# Patient Record
Sex: MALE | Race: WHITE | NOT HISPANIC OR LATINO | Employment: FULL TIME | URBAN - METROPOLITAN AREA
[De-identification: names, ages, dates, MRNs, and addresses within clinical notes are randomized per-mention and may not be internally consistent; named-entity substitution may affect disease eponyms.]

---

## 2018-09-04 ENCOUNTER — TRANSCRIBE ORDERS (OUTPATIENT)
Dept: ADMINISTRATIVE | Facility: HOSPITAL | Age: 63
End: 2018-09-04

## 2018-09-04 DIAGNOSIS — K92.2 GASTROINTESTINAL HEMORRHAGE, UNSPECIFIED GASTROINTESTINAL HEMORRHAGE TYPE: ICD-10-CM

## 2018-09-04 DIAGNOSIS — D64.9 ANEMIA, UNSPECIFIED TYPE: Primary | ICD-10-CM

## 2018-09-07 ENCOUNTER — HOSPITAL ENCOUNTER (OUTPATIENT)
Dept: RADIOLOGY | Facility: HOSPITAL | Age: 63
Discharge: HOME | End: 2018-09-07
Attending: INTERNAL MEDICINE
Payer: COMMERCIAL

## 2018-09-07 DIAGNOSIS — K92.2 GASTROINTESTINAL HEMORRHAGE, UNSPECIFIED GASTROINTESTINAL HEMORRHAGE TYPE: ICD-10-CM

## 2018-09-07 DIAGNOSIS — D64.9 ANEMIA, UNSPECIFIED TYPE: ICD-10-CM

## 2018-09-07 PROCEDURE — 74250 X-RAY XM SM INT 1CNTRST STD: CPT

## 2021-04-13 DIAGNOSIS — Z23 ENCOUNTER FOR IMMUNIZATION: ICD-10-CM

## 2021-08-17 ENCOUNTER — TELEPHONE (OUTPATIENT)
Dept: GASTROENTEROLOGY | Facility: CLINIC | Age: 66
End: 2021-08-17

## 2021-08-17 NOTE — TELEPHONE ENCOUNTER
Called and spoke with patient regarding an overdue recall for EGD for hx of Woodward's  After going through chart and asking questions, he has to check with his wife due to her being a  and going back to school  He will call back and let me know what day is good for her  I will call him in 2 weeks if he doesn't return call  Check list is complete, just have to put date on it

## 2021-08-31 NOTE — TELEPHONE ENCOUNTER
Called and spoke with patient  He was able to discuss with wife and they will have to put off until early next year  He will call when ready to schedule

## 2022-04-15 ENCOUNTER — OFFICE VISIT (OUTPATIENT)
Dept: FAMILY MEDICINE CLINIC | Facility: CLINIC | Age: 67
End: 2022-04-15
Payer: COMMERCIAL

## 2022-04-15 VITALS
DIASTOLIC BLOOD PRESSURE: 84 MMHG | TEMPERATURE: 98.4 F | SYSTOLIC BLOOD PRESSURE: 120 MMHG | BODY MASS INDEX: 36.02 KG/M2 | WEIGHT: 271.8 LBS | HEART RATE: 97 BPM | OXYGEN SATURATION: 97 % | HEIGHT: 73 IN

## 2022-04-15 DIAGNOSIS — Z12.11 COLON CANCER SCREENING: ICD-10-CM

## 2022-04-15 DIAGNOSIS — K21.00 GASTROESOPHAGEAL REFLUX DISEASE WITH ESOPHAGITIS, UNSPECIFIED WHETHER HEMORRHAGE: Primary | ICD-10-CM

## 2022-04-15 DIAGNOSIS — K21.9 GASTROESOPHAGEAL REFLUX DISEASE, UNSPECIFIED WHETHER ESOPHAGITIS PRESENT: Primary | ICD-10-CM

## 2022-04-15 PROCEDURE — 1036F TOBACCO NON-USER: CPT | Performed by: FAMILY MEDICINE

## 2022-04-15 PROCEDURE — 3008F BODY MASS INDEX DOCD: CPT | Performed by: FAMILY MEDICINE

## 2022-04-15 PROCEDURE — 3725F SCREEN DEPRESSION PERFORMED: CPT | Performed by: FAMILY MEDICINE

## 2022-04-15 PROCEDURE — 99213 OFFICE O/P EST LOW 20 MIN: CPT | Performed by: FAMILY MEDICINE

## 2022-04-15 RX ORDER — DIPHENOXYLATE HYDROCHLORIDE AND ATROPINE SULFATE 2.5; .025 MG/1; MG/1
1 TABLET ORAL DAILY
COMMUNITY

## 2022-04-15 RX ORDER — GLUCOSAM/CHONDRO/HERB 149/HYAL 750-100 MG
3 TABLET ORAL DAILY
COMMUNITY

## 2022-04-15 RX ORDER — B-COMPLEX WITH VITAMIN C
TABLET ORAL
COMMUNITY

## 2022-04-15 RX ORDER — PANTOPRAZOLE SODIUM 40 MG/1
40 TABLET, DELAYED RELEASE ORAL DAILY
Qty: 90 TABLET | Refills: 1 | Status: SHIPPED | OUTPATIENT
Start: 2022-04-15

## 2022-04-15 RX ORDER — PANTOPRAZOLE SODIUM 40 MG/1
40 TABLET, DELAYED RELEASE ORAL DAILY
COMMUNITY
Start: 2022-03-22 | End: 2022-04-15 | Stop reason: SDUPTHER

## 2022-04-26 NOTE — PROGRESS NOTES
Patient ID: Luisana Moe is a 61 y o  male  HPI: 61 y o male presents to get established into practice  He has a history of Barretts and GERD  He gets screened regularly for GERD  SUBJECTIVE    Family History   Problem Relation Age of Onset    Valvular heart disease Mother     Stroke Father     Hyperlipidemia Father     Macular degeneration Sister      Social History     Socioeconomic History    Marital status: /Civil Union     Spouse name: Not on file    Number of children: Not on file    Years of education: Not on file    Highest education level: Not on file   Occupational History    Not on file   Tobacco Use    Smoking status: Never Smoker    Smokeless tobacco: Never Used   Vaping Use    Vaping Use: Never used   Substance and Sexual Activity    Alcohol use:  Yes     Alcohol/week: 5 0 standard drinks     Types: 5 Standard drinks or equivalent per week    Drug use: Never    Sexual activity: Yes     Partners: Female   Other Topics Concern    Not on file   Social History Narrative    Not on file     Social Determinants of Health     Financial Resource Strain: Not on file   Food Insecurity: Not on file   Transportation Needs: Not on file   Physical Activity: Not on file   Stress: Not on file   Social Connections: Not on file   Intimate Partner Violence: Not on file   Housing Stability: Not on file     Past Medical History:   Diagnosis Date    Woodward's esophagus     GERD (gastroesophageal reflux disease)     Hiatal hernia      Past Surgical History:   Procedure Laterality Date    CHOLECYSTECTOMY      CIRCUMCISION      KNEE SURGERY      TONSILLECTOMY       No Known Allergies    Current Outpatient Medications:     Misc Natural Products (Glucosamine Chond Complex/MSM) TABS, Take 3 capsules by mouth daily, Disp: , Rfl:     Multiple Vitamins-Minerals (ICAPS AREDS 2 PO), Take 2 capsules by mouth daily, Disp: , Rfl:     multivitamin (THERAGRAN) TABS, Take 1 tablet by mouth daily, Disp: , Rfl:     Zinc 100 MG TABS, Take by mouth, Disp: , Rfl:     pantoprazole (PROTONIX) 40 mg tablet, Take 1 tablet (40 mg total) by mouth daily, Disp: 90 tablet, Rfl: 1    Review of Systems  Constitutional:     Denies fever, chills ,fatigue ,weakness ,weight loss, weight gain     ENT: Denies earache ,loss of hearing ,nosebleed, nasal discharge,nasal congestion ,sore throat ,hoarseness  Pulmonary: Denies shortness of breath ,cough  ,dyspnea on exertion, orthopnea  ,PND   Cardiovascular:  Denies bradycardia , tachycardia  ,palpations, lower extremity edema leg, claudication  Breast:  Denies new or changing breast lumps ,nipple discharge ,nipple changes  Abdomen:  Denies abdominal pain , anorexia , indigestion, nausea, vomiting, constipation, diarrhea  Musculoskeletal: Denies myalgias, arthralgias, joint swelling, joint stiffness , limb pain, limb swelling  Gu: denies dysuria, polyuria  Skin: Denies skin rash, skin lesion, skin wound, itching, dry skin  Neuro: Denies headache, numbness, tingling, confusion, loss of consciousness, dizziness, vertigo  Psychiatric: Denies feelings of depression, suicidal ideation, anxiety, sleep disturbances    OBJECTIVE  /84   Pulse 97   Temp 98 4 °F (36 9 °C)   Ht 6' 1" (1 854 m)   Wt 123 kg (271 lb 12 8 oz)   SpO2 97%   BMI 35 86 kg/m²   Constitutional:   NAD, well appearing and well nourished      ENT:   Conjunctiva and lids: no injection, edema, or discharge     Pupils and iris: PATRICK bilaterally    External inspection of ears and nose: normal without deformities or discharge  Otoscopic exam: Canals patent without erythema  Nasal mucosa, septum and turbinates: Normal or edema or discharge         Oropharynx:  Moist mucosa, normal tongue and tonsils without lesions  No erythema        Pulmonary:Respiratory effort normal rate and rhythm, no increased work of breathing   Auscultation of lungs:  Clear bilaterally with no adventitious breath sounds Cardiovascular: regular rate and rhythm, S1 and S2, no murmur, no edema and/or varicosities of LE      Abdomen: Soft and non-distended     Positive bowel sounds      No heptomegaly or splenomegaly      Gu: no suprapubic tenderness or CVA tenderness, no urethral discharge  Lymphatic:  No anterior or posterior cervical lymphadenopathy         Musculoskeletal:  Gait and station: Normal gait      Digits and nails normal without clubbing or cyanosis       Inspection/palpation of joints, bones, and muscles:  No joint tenderness, swelling, full active and passive range of motion       Skin: Normal skin turgor and no rashes      Neuro:     Normal reflexes   Psych:   alert and oriented to person, place and time     normal mood and affect       Assessment/Plan:Diagnoses and all orders for this visit:    Gastroesophageal reflux disease with esophagitis, unspecified whether hemorrhage    Colon cancer screening  -     Ambulatory referral for colonoscopy; Future    Other orders  -     Misc Natural Products (Glucosamine Chond Complex/MSM) TABS; Take 3 capsules by mouth daily  -     multivitamin (THERAGRAN) TABS; Take 1 tablet by mouth daily  -     Multiple Vitamins-Minerals (ICAPS AREDS 2 PO); Take 2 capsules by mouth daily  -     Discontinue: pantoprazole (PROTONIX) 40 mg tablet; Take 40 mg by mouth daily  -     Zinc 100 MG TABS; Take by mouth        Reviewed with patient plan to treat with abpve plan  Patiient instructed to call in 72 hours if not feeling better or if symptoms worsen

## 2022-05-02 ENCOUNTER — TELEPHONE (OUTPATIENT)
Dept: FAMILY MEDICINE CLINIC | Facility: CLINIC | Age: 67
End: 2022-05-02

## 2022-05-02 NOTE — TELEPHONE ENCOUNTER
HOME COVID TEST POSITIVE TODAY      Symptoms started 4/28/22   Cough  Sore throat  Congestion   SOB with activity   Fatigue  No fever    Asking for a virtual appointment with you ASAP

## 2022-05-02 NOTE — TELEPHONE ENCOUNTER
Please see if you can switch my 1100 with this pt for 1030 so the 1100 will be this patient for a virtual covid appt tomorrow

## 2022-05-03 ENCOUNTER — TELEMEDICINE (OUTPATIENT)
Dept: FAMILY MEDICINE CLINIC | Facility: CLINIC | Age: 67
End: 2022-05-03
Payer: COMMERCIAL

## 2022-05-03 DIAGNOSIS — U07.1 COVID-19: Primary | ICD-10-CM

## 2022-05-03 PROCEDURE — 99213 OFFICE O/P EST LOW 20 MIN: CPT | Performed by: FAMILY MEDICINE

## 2022-05-03 PROCEDURE — 1036F TOBACCO NON-USER: CPT | Performed by: FAMILY MEDICINE

## 2022-05-03 RX ORDER — AZITHROMYCIN 250 MG/1
TABLET, FILM COATED ORAL
Qty: 6 TABLET | Refills: 0 | Status: SHIPPED | OUTPATIENT
Start: 2022-05-03 | End: 2022-05-07

## 2022-05-03 RX ORDER — BROMPHENIRAMINE MALEATE, PSEUDOEPHEDRINE HYDROCHLORIDE, AND DEXTROMETHORPHAN HYDROBROMIDE 2; 30; 10 MG/5ML; MG/5ML; MG/5ML
10 SYRUP ORAL 4 TIMES DAILY PRN
Qty: 180 ML | Refills: 0 | Status: SHIPPED | OUTPATIENT
Start: 2022-05-03

## 2022-05-03 RX ORDER — PREDNISONE 10 MG/1
TABLET ORAL
Qty: 26 TABLET | Refills: 0 | Status: SHIPPED | OUTPATIENT
Start: 2022-05-03

## 2022-05-04 ENCOUNTER — TELEPHONE (OUTPATIENT)
Dept: FAMILY MEDICINE CLINIC | Facility: CLINIC | Age: 67
End: 2022-05-04

## 2022-05-04 NOTE — TELEPHONE ENCOUNTER
To be on the safe side, its recommended for vaccinated pts to isolate for 5 days and mask when out in public for a week after  Because of her his [facility] [facility]  and health issues, I'd wait 10 days before coming in contact with him and mask for a week after when around him

## 2022-05-04 NOTE — TELEPHONE ENCOUNTER
Patient called to give you an update since testing positive for COVID     He said he is feeling much better  He still has a slight cough and is a little stuffy   His voice is getting better    His wife also tested positive on 4/28    They are concerned about when they can safely be around he elderly father

## 2022-05-10 NOTE — PROGRESS NOTES
COVID-19 Outpatient Progress Note    Assessment/Plan:    Problem List Items Addressed This Visit     None      Visit Diagnoses     COVID-19    -  Primary    Relevant Medications    predniSONE 10 mg tablet    brompheniramine-pseudoephedrine-DM 30-2-10 MG/5ML syrup         Disposition:     Patient has COVID-19 infection  Based off CDC guidelines, they were recommended to isolate for 5 days from the date of the positive test  If they remain asymptomatic, isolation may be ended followed by 5 days of wearing a mask when around othes to minimize risk of infecting others  If they have a fever, continue to stay home until fever resolves for at least 24 hours  I have spent 15 minutes directly with the patient  Greater than 50% of this time was spent in counseling/coordination of care regarding: diagnostic results, prognosis, risks and benefits of treatment options, instructions for management, patient and family education, importance of treatment compliance, risk factor reductions and impressions  Encounter provider Tony Schaefer DO    Provider located at [address] [id number]    Recent Visits  Date Type Provider Dept   05/04/22 Telephone Alexi Beavers DO [address] [place] [address]   05/03/22 [address] [place] [address] recent visits within past 7 days and meeting all other requirements  Future Appointments  No visits were found meeting these conditions  Showing future appointments within next 150 days and meeting all other requirements     This virtual check-in was done via [address] and patient was informed that this is a secure, HIPAA-compliant platform  He agrees to proceed  Patient agrees to participate in a virtual check in via telephone or video visit instead of presenting to the office to address urgent/immediate medical needs  Patient is aware this is a billable service      After connecting through [facility], the patient was identified by name and date of birth  Nestor Bernard was informed that this was a telemedicine visit and that the exam was being conducted confidentially over secure lines  My office door was closed  No one else was in the room  Nestor Bernard acknowledged consent and understanding of privacy and security of the telemedicine visit  I informed the patient that I have reviewed his record in Epic and presented the opportunity for him to ask any questions regarding the visit today  The patient agreed to participate  Verification of patient location:  Patient is located in the following state in which I hold an active license: [place]    Subjective:   Nestor Bernard is a 61 y o  male who has been screened for COVID-19  Symptom change since last report: unchanged  Patient's symptoms include nasal congestion, sore throat, cough, myalgias and headache  Patient denies fever, chills, fatigue, malaise, rhinorrhea, anosmia, loss of taste, shortness of breath, chest tightness, abdominal pain, nausea, vomiting and diarrhea  - Date of symptom onset: 5/3/2022  - Date of positive COVID-19 test: 5/3/2022  COVID-19 vaccination status: Fully vaccinated with booster    Keyona Fair has been staying home and has isolated themselves in his home  He is taking care to not share personal items and is cleaning all surfaces that are touched often, like counters, tabletops, and doorknobs using household cleaning sprays or wipes  He is wearing a mask when he leaves his room       Lab Results   Component Value Date    [facility] Not Detected 09/18/2021     Past Medical History:   Diagnosis Date    Woodward's esophagus     GERD (gastroesophageal reflux disease)     Hiatal hernia      Past Surgical History:   Procedure Laterality Date    CHOLECYSTECTOMY      CIRCUMCISION      KNEE SURGERY      TONSILLECTOMY       Current Outpatient Medications   Medication Sig Dispense Refill    brompheniramine-pseudoephedrine-DM 30-2-10 MG/5ML syrup Take 10 mL by mouth 4 (four) times a day as needed for congestion or cough 180 mL 0    Misc Natural Products (Glucosamine Chond Complex/MSM) TABS Take 3 capsules by mouth daily      Multiple Vitamins-Minerals (ICAPS AREDS 2 PO) Take 2 capsules by mouth daily      multivitamin (THERAGRAN) TABS Take 1 tablet by mouth daily      pantoprazole (PROTONIX) 40 mg tablet Take 1 tablet (40 mg total) by mouth daily 90 tablet 1    predniSONE 10 mg tablet 3 tabs po bid x2 days, then 2 tabs po bid x2 days, then 1 tab bid x2 days, then 1 daily until done  26 tablet 0    Zinc 100 MG TABS Take by mouth       No current facility-administered medications for this visit  No Known Allergies    Review of Systems   Constitutional: Negative for chills, fatigue and fever  HENT: Positive for congestion, sinus pressure, sinus pain and sore throat  Negative for rhinorrhea  Respiratory: Positive for cough  Negative for chest tightness and shortness of breath  Gastrointestinal: Negative for abdominal pain, diarrhea, nausea and vomiting  Musculoskeletal: Positive for myalgias  Neurological: Positive for headaches  All other systems reviewed and are negative  Objective: There were no vitals filed for this visit  Physical Exam  Vitals reviewed  Constitutional:       Appearance: Normal appearance  He is not ill-appearing  Eyes:      General:         Right eye: No discharge  Left eye: No discharge  Pulmonary:      Effort: No respiratory distress  Skin:     Findings: No rash  Neurological:      Mental Status: He is alert and oriented to person, place, and time  Mental status is at baseline  Psychiatric:         Mood and Affect: Mood normal          Behavior: Behavior normal          Thought Content:  Thought content normal          Judgment: Judgment normal          VIRTUAL VISIT DISCLAIMER    Mack Merlin verbally agrees to participate in Virtual Care Services  Pt is aware that [employer] could be limited without vital signs or the ability to perform a full hands-on physical exam  Manuel Gordillo understands he or the provider may request at any time to terminate the video visit and request the patient to seek care or treatment in person

## 2022-07-25 ENCOUNTER — TELEPHONE (OUTPATIENT)
Dept: FAMILY MEDICINE CLINIC | Facility: CLINIC | Age: 67
End: 2022-07-25

## 2022-07-25 DIAGNOSIS — J06.9 UPPER RESPIRATORY TRACT INFECTION, UNSPECIFIED TYPE: Primary | ICD-10-CM

## 2022-07-25 DIAGNOSIS — J02.9 SORE THROAT: Primary | ICD-10-CM

## 2022-07-25 RX ORDER — AZITHROMYCIN 250 MG/1
TABLET, FILM COATED ORAL
Qty: 6 TABLET | Refills: 0 | Status: SHIPPED | OUTPATIENT
Start: 2022-07-25 | End: 2022-07-29

## 2022-07-25 RX ORDER — PREDNISONE 10 MG/1
TABLET ORAL
Qty: 26 TABLET | Refills: 0 | Status: SHIPPED | OUTPATIENT
Start: 2022-07-25 | End: 2022-08-17

## 2022-07-25 RX ORDER — BROMPHENIRAMINE MALEATE, PSEUDOEPHEDRINE HYDROCHLORIDE, AND DEXTROMETHORPHAN HYDROBROMIDE 2; 30; 10 MG/5ML; MG/5ML; MG/5ML
10 SYRUP ORAL 4 TIMES DAILY PRN
Qty: 180 ML | Refills: 0 | Status: SHIPPED | OUTPATIENT
Start: 2022-07-25 | End: 2022-08-17

## 2022-07-25 NOTE — TELEPHONE ENCOUNTER
Patient wife called reporting that patient is experiencing a sore throat, cough, post nasal drip, chest congestion, sinus pressure, headache, SOB, extreme fatigue  Negative at home COVID test 7/23, 7/24  He is taking another at home COVID test today and call back with the results  Wife asking if he needs to be seen, and if an antibiotic can be sent to the pharmacy

## 2022-07-31 ENCOUNTER — OFFICE VISIT (OUTPATIENT)
Dept: URGENT CARE | Facility: CLINIC | Age: 67
End: 2022-07-31
Payer: COMMERCIAL

## 2022-07-31 VITALS — HEART RATE: 78 BPM | RESPIRATION RATE: 20 BRPM | OXYGEN SATURATION: 99 % | TEMPERATURE: 97.4 F

## 2022-07-31 DIAGNOSIS — M94.0 COSTOCHONDRITIS: ICD-10-CM

## 2022-07-31 DIAGNOSIS — R09.82 POST-NASAL DRIP: Primary | ICD-10-CM

## 2022-07-31 PROCEDURE — 99203 OFFICE O/P NEW LOW 30 MIN: CPT | Performed by: NURSE PRACTITIONER

## 2022-07-31 RX ORDER — FLUTICASONE PROPIONATE 50 MCG
1 SPRAY, SUSPENSION (ML) NASAL DAILY
Qty: 16 G | Refills: 0 | Status: SHIPPED | OUTPATIENT
Start: 2022-07-31 | End: 2022-08-17

## 2022-07-31 RX ORDER — GUAIFENESIN AND CODEINE PHOSPHATE 100; 10 MG/5ML; MG/5ML
5 SOLUTION ORAL 3 TIMES DAILY PRN
COMMUNITY
End: 2022-08-17

## 2022-07-31 NOTE — PROGRESS NOTES
[facility]        NAME: Nevin Mancuso is a 61 y o  male  : 1955    MRN: [id number]  DATE: 2022  TIME: 3:38 PM    Assessment and Plan   Post-nasal drip [R09 82]  1  Post-nasal drip  fluticasone (FLONASE) 50 mcg/act nasal spray   2  Costochondritis           Patient Instructions       Follow up with PCP in 3-5 days  Proceed to  ER if symptoms worsen  Patient Instructions   Complete oral steroid as previously prescribed  May discontinue Bromfed DM and take Delsym nightly for reducing night time cough and giving better sleep  Practice deep breathing exercises once every hour to improve lung capacity  Advised daily instillation of fluticasone nasal spray to reduce post nasal drip which may be sole cause of coughing and throat clearing  Reassured concerning intercostal reproducible tenderness  May take ibuprofen every 8 hours with food for 3 days and apply ice to affected area as needed  Follow up with PCP for any persistent or worsening symptoms  Report to ER for any chest pain, pressure especially with radiation to left arm, shoulder or neck  Chief Complaint     Chief Complaint   Patient presents with    Cough     Started with sx over one week ago  Saw PCP last week gave steroids and Z pack  Using codeine from previously for cough, Covid negative  States of still feeling chest congestion  History of Present Illness       Symptoms chest congestion approx 10 days ago with strep throat infection with cough  Was prescribed oral antibiotic therapy and Bromfed DM  Completion of therapy with persistent need to clear throat and voice hoarseness  Feeling like "can't take full breath "  Currently completing oral steroid taper therapy as prescribed by PCP  No chest pain or heart palpitations  Afebrile  No vomiting or diarrhea  At home CoVid test this afternoon negative  Review of Systems   Review of Systems   Constitutional: Negative for fever     HENT: Positive for sore throat (resolved) and voice change  Negative for congestion, ear pain and sneezing  Eyes: Negative for discharge and redness  Respiratory: Positive for cough, chest tightness and shortness of breath (with exercise recently)  Cardiovascular: Negative for chest pain and palpitations  Gastrointestinal: Negative for abdominal pain, diarrhea, nausea and vomiting  Genitourinary: Negative for decreased urine volume  Musculoskeletal: Negative for myalgias  Allergic/Immunologic: Negative for environmental allergies  Neurological: Negative for dizziness, syncope and headaches  Hematological: Negative for adenopathy  Psychiatric/Behavioral: Negative for sleep disturbance           Current Medications       Current Outpatient Medications:     fluticasone (FLONASE) 50 mcg/act nasal spray, 1 spray into each nostril daily, Disp: 16 g, Rfl: 0    guaifenesin-codeine (GUAIFENESIN AC) 100-10 MG/5ML liquid, Take 5 mL by mouth 3 (three) times a day as needed for cough, Disp: , Rfl:     Misc Natural Products (Glucosamine Chond Complex/MSM) TABS, Take 3 capsules by mouth daily, Disp: , Rfl:     Multiple Vitamins-Minerals (ICAPS AREDS 2 PO), Take 2 capsules by mouth daily, Disp: , Rfl:     multivitamin (THERAGRAN) TABS, Take 1 tablet by mouth daily, Disp: , Rfl:     pantoprazole (PROTONIX) 40 mg tablet, Take 1 tablet (40 mg total) by mouth daily, Disp: 90 tablet, Rfl: 1    predniSONE 10 mg tablet, 3 tabs po bid x2 days, then 2 tabs po bid x2 days, then 1 tab bid x2 days, then 1 daily until done , Disp: 26 tablet, Rfl: 0    predniSONE 10 mg tablet, 3 tabs po bid x2 days, then 2 tabs po bid x2 days, then 1 tab bid x2 days, then 1 daily until done , Disp: 26 tablet, Rfl: 0    Zinc 100 MG TABS, Take by mouth, Disp: , Rfl:     brompheniramine-pseudoephedrine-DM 30-2-10 MG/5ML syrup, Take 10 mL by mouth 4 (four) times a day as needed for congestion or cough (Patient not taking: Reported on 7/31/2022), Disp: 180 mL, Rfl: 0    brompheniramine-pseudoephedrine-DM 30-2-10 MG/5ML syrup, Take 10 mL by mouth 4 (four) times a day as needed for congestion or cough (Patient not taking: Reported on 7/31/2022), Disp: 180 mL, Rfl: 0    Current Allergies     Allergies as of 07/31/2022    (No Known Allergies)            The following portions of the patient's history were reviewed and updated as appropriate: allergies, current medications, past family history, past medical history, past social history, past surgical history and problem list      Past Medical History:   Diagnosis Date    Woodward's esophagus     GERD (gastroesophageal reflux disease)     Hiatal hernia        Past Surgical History:   Procedure Laterality Date    CHOLECYSTECTOMY      CIRCUMCISION      KNEE SURGERY      TONSILLECTOMY         Family History   Problem Relation Age of Onset    Valvular heart disease Mother     Stroke Father     Hyperlipidemia Father     Macular degeneration Sister          Medications have been verified  Objective   Pulse 78   Temp (!) 97 4 °F (36 3 °C) (Temporal)   Resp 20   SpO2 99%   No LMP for male patient  Physical Exam     Physical Exam  Vitals reviewed  Constitutional:       General: He is awake  Appearance: He is well-developed  HENT:      Head: Normocephalic  Right Ear: Tympanic membrane and ear canal normal       Left Ear: Tympanic membrane and ear canal normal       Nose: Nose normal       Mouth/Throat:      Lips: Pink  Mouth: Mucous membranes are moist       Pharynx: Posterior oropharyngeal erythema (cobblestoning with clear nasal drip noted right pharynx) present  Eyes:      General: Lids are normal          Right eye: No discharge  Left eye: No discharge  Cardiovascular:      Rate and Rhythm: Regular rhythm        Heart sounds: Normal heart sounds, S1 normal and S2 normal    Pulmonary:      Effort: Pulmonary effort is normal       Breath sounds: Normal breath sounds and air entry  No decreased air movement  No wheezing or rhonchi  Chest:      Chest wall: Tenderness (intercostal bilaterally reproducible between ribs 2-3, 3-4) present  Abdominal:      General: Bowel sounds are normal       Palpations: Abdomen is soft  Musculoskeletal:      Cervical back: Normal range of motion  Lymphadenopathy:      Cervical: No cervical adenopathy  Skin:     General: Skin is warm and dry  Neurological:      Mental Status: He is alert  Psychiatric:         Behavior: Behavior normal  Behavior is cooperative

## 2022-07-31 NOTE — PATIENT INSTRUCTIONS
Complete oral steroid as previously prescribed  May discontinue Bromfed DM and take Delsym nightly for reducing night time cough and giving better sleep  Practice deep breathing exercises once every hour to improve lung capacity  Advised daily instillation of fluticasone nasal spray to reduce post nasal drip which may be sole cause of coughing and throat clearing  Reassured concerning intercostal reproducible tenderness  May take ibuprofen every 8 hours with food for 3 days and apply ice to affected area as needed  Follow up with PCP for any persistent or worsening symptoms  Report to ER for any chest pain, pressure especially with radiation to left arm, shoulder or neck

## 2022-08-05 ENCOUNTER — TELEPHONE (OUTPATIENT)
Dept: GASTROENTEROLOGY | Facility: CLINIC | Age: 67
End: 2022-08-05

## 2022-08-05 RX ORDER — AMOXICILLIN AND CLAVULANATE POTASSIUM 875; 125 MG/1; MG/1
1 TABLET, FILM COATED ORAL EVERY 12 HOURS SCHEDULED
Qty: 14 TABLET | Refills: 0 | Status: SHIPPED | OUTPATIENT
Start: 2022-08-05 | End: 2022-08-12

## 2022-08-05 NOTE — TELEPHONE ENCOUNTER
Patients GI provider:  Dr Boby Daniel    Number to return call: [phone]    Reason for call: Pt calling to speak to someone about possible dates for his colonoscopy so he has an idea of time frame  Above is his number       Scheduled procedure/appointment date if applicable: Appt  0/29/49

## 2022-08-05 NOTE — TELEPHONE ENCOUNTER
Patient called to report that he is still not feeling better  While talking he has to clear his throat constantly  He went to urgent care on 7/31, they listened to his lungs, and was told he was fine  Patient asking what he should do now as he still does not feel well

## 2022-08-08 NOTE — TELEPHONE ENCOUNTER
I called and spoke to pt  I gave him a date for his colon  EGD will need to be added @ NP OV 8/17  Pt will need to have check list done and prep given  Thank you

## 2022-08-17 ENCOUNTER — OFFICE VISIT (OUTPATIENT)
Dept: GASTROENTEROLOGY | Facility: CLINIC | Age: 67
End: 2022-08-17
Payer: COMMERCIAL

## 2022-08-17 VITALS — WEIGHT: 273 LBS | BODY MASS INDEX: 36.18 KG/M2 | HEIGHT: 73 IN

## 2022-08-17 DIAGNOSIS — K22.70 BARRETT'S ESOPHAGUS WITHOUT DYSPLASIA: Primary | ICD-10-CM

## 2022-08-17 DIAGNOSIS — Z12.11 COLON CANCER SCREENING: Primary | ICD-10-CM

## 2022-08-17 DIAGNOSIS — Z86.010 PERSONAL HISTORY OF COLONIC POLYPS: ICD-10-CM

## 2022-08-17 DIAGNOSIS — K21.9 GASTROESOPHAGEAL REFLUX DISEASE WITHOUT ESOPHAGITIS: ICD-10-CM

## 2022-08-17 DIAGNOSIS — K22.70 BARRETT'S ESOPHAGUS WITHOUT DYSPLASIA: ICD-10-CM

## 2022-08-17 PROCEDURE — 99204 OFFICE O/P NEW MOD 45 MIN: CPT | Performed by: INTERNAL MEDICINE

## 2022-08-17 NOTE — H&P (VIEW-ONLY)
[facility] [address] Consultation  Rosa Rodney 74 y o  male MRN: [id number]  Encounter: [id number]          ASSESSMENT AND PLAN:      1  Colon cancer screening  -     Ambulatory referral for colonoscopy    2  Woodward's esophagus without dysplasia    3  Gastroesophageal reflux disease without esophagitis       History of Woodward's esophagus acid reflux, heartburn anti-reflux discussed, schedule EGD, anti-reflux measures reinforced side effects of long-term PPI use reviewed  Schedule colonoscopy, history of polyps last exam 4 years ago, 3 year follow-up was recommended  Procedure and prep discussed at length  Encouraged him to lose some weight and be more active   ______________________________________________________________________    HPI:      Patient came in for evaluation of his history of heartburn acid reflux indigestion, Woodward's mucosa, takes pantoprazole on a daily basis  If he eats something acid your late night, and does have some heartburn indigestion nocturnal symptoms anyone regurgitation  His appetite is fair, he may have gained some weight  Not very active, drink some coughing not many soda  Couple drinks a night  Denies dysphagia  No melena hematochezia, bowels are sometimes generally regular  Denies any chest pain shortness of breath fever chills rash, no history of CVA Ca CAD  Works from home  Diet medications more than 10 systems reviewed  REVIEW OF SYSTEMS:    CONSTITUTIONAL: Denies any fever, chills, rigors, and weight loss  HEENT: No earache or tinnitus  CARDIOVASCULAR: No chest pain or palpitations  RESPIRATORY: Denies any cough, hemoptysis, shortness of breath or dyspnea on exertion  GASTROINTESTINAL: As noted in the History of Present Illness  GENITOURINARY: Denies any hematuria or dysuria  NEUROLOGIC: No dizziness or vertigo  MUSCULOSKELETAL: Denies any joint swellings  SKIN: Denies skin rashes or itching     ENDOCRINE: Denies excessive thirst  Denies intolerance to heat or cold  PSYCHOSOCIAL: Denies depression or anxiety  Denies any recent memory loss  Historical Information   Past Medical History:   Diagnosis Date    Woodward's esophagus     GERD (gastroesophageal reflux disease)     Hiatal hernia      Past Surgical History:   Procedure Laterality Date    CHOLECYSTECTOMY      CIRCUMCISION      KNEE SURGERY      TONSILLECTOMY       Social History   Social History     Substance and Sexual Activity   Alcohol Use Yes    Alcohol/week: 5 0 standard drinks    Types: 5 Standard drinks or equivalent per week     Social History     Substance and Sexual Activity   Drug Use Never     Social History     Tobacco Use   Smoking Status Never Smoker   Smokeless Tobacco Never Used     Family History   Problem Relation Age of Onset    Valvular heart disease Mother     Stroke Father     Hyperlipidemia Father     Macular degeneration Sister        Meds/Allergies       Current Outpatient Medications:     Misc Natural Products (Glucosamine Chond Complex/MSM) TABS    Multiple Vitamins-Minerals (ICAPS AREDS 2 PO)    multivitamin (THERAGRAN) TABS    pantoprazole (PROTONIX) 40 mg tablet    Zinc 100 MG TABS    No Known Allergies        Objective     Height 6' 1" (1 854 m), weight 124 kg (273 lb)  Body mass index is 36 02 kg/m²  PHYSICAL EXAM:      General Appearance:   Alert, cooperative, no distress   HEENT:   Normocephalic, atraumatic, anicteric  Neck:  Supple, symmetrical, trachea midline   Lungs:   Clear to auscultation bilaterally; no rales, rhonchi or wheezing; respirations unlabored    Heart[de-identified]   Regular rate and rhythm; no murmur     Abdomen:   Soft, non-tender, non-distended; normal bowel sounds; no masses, no organomegaly    Genitalia:   Deferred    Rectal:   Deferred    Extremities:  No cyanosis, clubbing or edema    Skin:  No jaundice, rashes, or lesions    Lymph nodes:  No palpable cervical lymphadenopathy Lab Results:   No visits with results within 1 Day(s) from this visit  Latest known visit with results is:   No results found for any previous visit  Radiology Results:   No results found

## 2022-08-17 NOTE — PROGRESS NOTES
[facility] [address] Consultation  Alberto Akins 74 y o  male MRN: [id number]  Encounter: [id number]          ASSESSMENT AND PLAN:      1  Colon cancer screening  -     Ambulatory referral for colonoscopy    2  Woodward's esophagus without dysplasia    3  Gastroesophageal reflux disease without esophagitis       History of Woodward's esophagus acid reflux, heartburn anti-reflux discussed, schedule EGD, anti-reflux measures reinforced side effects of long-term PPI use reviewed  Schedule colonoscopy, history of polyps last exam 4 years ago, 3 year follow-up was recommended  Procedure and prep discussed at length  Encouraged him to lose some weight and be more active   ______________________________________________________________________    HPI:      Patient came in for evaluation of his history of heartburn acid reflux indigestion, Woodward's mucosa, takes pantoprazole on a daily basis  If he eats something acid your late night, and does have some heartburn indigestion nocturnal symptoms anyone regurgitation  His appetite is fair, he may have gained some weight  Not very active, drink some coughing not many soda  Couple drinks a night  Denies dysphagia  No melena hematochezia, bowels are sometimes generally regular  Denies any chest pain shortness of breath fever chills rash, no history of CVA Ca CAD  Works from home  Diet medications more than 10 systems reviewed  REVIEW OF SYSTEMS:    CONSTITUTIONAL: Denies any fever, chills, rigors, and weight loss  HEENT: No earache or tinnitus  CARDIOVASCULAR: No chest pain or palpitations  RESPIRATORY: Denies any cough, hemoptysis, shortness of breath or dyspnea on exertion  GASTROINTESTINAL: As noted in the History of Present Illness  GENITOURINARY: Denies any hematuria or dysuria  NEUROLOGIC: No dizziness or vertigo  MUSCULOSKELETAL: Denies any joint swellings  SKIN: Denies skin rashes or itching     ENDOCRINE: Denies excessive thirst  Denies intolerance to heat or cold  PSYCHOSOCIAL: Denies depression or anxiety  Denies any recent memory loss  Historical Information   Past Medical History:   Diagnosis Date    Woodward's esophagus     GERD (gastroesophageal reflux disease)     Hiatal hernia      Past Surgical History:   Procedure Laterality Date    CHOLECYSTECTOMY      CIRCUMCISION      KNEE SURGERY      TONSILLECTOMY       Social History   Social History     Substance and Sexual Activity   Alcohol Use Yes    Alcohol/week: 5 0 standard drinks    Types: 5 Standard drinks or equivalent per week     Social History     Substance and Sexual Activity   Drug Use Never     Social History     Tobacco Use   Smoking Status Never Smoker   Smokeless Tobacco Never Used     Family History   Problem Relation Age of Onset    Valvular heart disease Mother     Stroke Father     Hyperlipidemia Father     Macular degeneration Sister        Meds/Allergies       Current Outpatient Medications:     Misc Natural Products (Glucosamine Chond Complex/MSM) TABS    Multiple Vitamins-Minerals (ICAPS AREDS 2 PO)    multivitamin (THERAGRAN) TABS    pantoprazole (PROTONIX) 40 mg tablet    Zinc 100 MG TABS    No Known Allergies        Objective     Height 6' 1" (1 854 m), weight 124 kg (273 lb)  Body mass index is 36 02 kg/m²  PHYSICAL EXAM:      General Appearance:   Alert, cooperative, no distress   HEENT:   Normocephalic, atraumatic, anicteric  Neck:  Supple, symmetrical, trachea midline   Lungs:   Clear to auscultation bilaterally; no rales, rhonchi or wheezing; respirations unlabored    Heart[de-identified]   Regular rate and rhythm; no murmur     Abdomen:   Soft, non-tender, non-distended; normal bowel sounds; no masses, no organomegaly    Genitalia:   Deferred    Rectal:   Deferred    Extremities:  No cyanosis, clubbing or edema    Skin:  No jaundice, rashes, or lesions    Lymph nodes:  No palpable cervical lymphadenopathy Lab Results:   No visits with results within 1 Day(s) from this visit  Latest known visit with results is:   No results found for any previous visit  Radiology Results:   No results found

## 2022-08-18 ENCOUNTER — TELEPHONE (OUTPATIENT)
Dept: GASTROENTEROLOGY | Facility: CLINIC | Age: 67
End: 2022-08-18

## 2022-08-19 ENCOUNTER — TELEPHONE (OUTPATIENT)
Dept: GASTROENTEROLOGY | Facility: CLINIC | Age: 67
End: 2022-08-19

## 2022-08-19 NOTE — TELEPHONE ENCOUNTER
Spoke to pt confirming pt's colonoscopy/egd scheduled on 8/26/22 at [facility] with Dr Rashad Cuevas   Informed pt [facility] would be calling 1-2 days prior with arrival time  Informed pt of clear liquid diet the day prior as well as the bowel cleansing preparation  Informed pt would need a  the day of the procedure due to being under sedation  Pt did not have any questions  Advised pt to contact insurance if has any questions regarding coverage of procedure

## 2022-08-24 ENCOUNTER — NURSE TRIAGE (OUTPATIENT)
Dept: OTHER | Facility: OTHER | Age: 67
End: 2022-08-24

## 2022-08-26 ENCOUNTER — ANESTHESIA EVENT (OUTPATIENT)
Dept: GASTROENTEROLOGY | Facility: AMBULATORY SURGERY CENTER | Age: 67
End: 2022-08-26

## 2022-08-26 ENCOUNTER — ANESTHESIA (OUTPATIENT)
Dept: GASTROENTEROLOGY | Facility: AMBULATORY SURGERY CENTER | Age: 67
End: 2022-08-26

## 2022-08-26 ENCOUNTER — HOSPITAL ENCOUNTER (OUTPATIENT)
Dept: GASTROENTEROLOGY | Facility: AMBULATORY SURGERY CENTER | Age: 67
Discharge: HOME | End: 2022-08-26
Payer: COMMERCIAL

## 2022-08-26 VITALS
BODY MASS INDEX: 35.12 KG/M2 | WEIGHT: 265 LBS | HEIGHT: 73 IN | SYSTOLIC BLOOD PRESSURE: 113 MMHG | HEART RATE: 81 BPM | DIASTOLIC BLOOD PRESSURE: 70 MMHG | RESPIRATION RATE: 18 BRPM | TEMPERATURE: 97.4 F | OXYGEN SATURATION: 93 %

## 2022-08-26 DIAGNOSIS — Z12.11 SCREENING FOR COLON CANCER: ICD-10-CM

## 2022-08-26 DIAGNOSIS — K22.70 BARRETT'S ESOPHAGUS WITHOUT DYSPLASIA: ICD-10-CM

## 2022-08-26 PROCEDURE — 43251 EGD REMOVE LESION SNARE: CPT | Performed by: INTERNAL MEDICINE

## 2022-08-26 PROCEDURE — 88305 TISSUE EXAM BY PATHOLOGIST: CPT | Performed by: PATHOLOGY

## 2022-08-26 PROCEDURE — 45385 COLONOSCOPY W/LESION REMOVAL: CPT | Performed by: INTERNAL MEDICINE

## 2022-08-26 PROCEDURE — 43239 EGD BIOPSY SINGLE/MULTIPLE: CPT | Performed by: INTERNAL MEDICINE

## 2022-08-26 RX ORDER — LIDOCAINE HYDROCHLORIDE 10 MG/ML
INJECTION, SOLUTION EPIDURAL; INFILTRATION; INTRACAUDAL; PERINEURAL AS NEEDED
Status: DISCONTINUED | OUTPATIENT
Start: 2022-08-26 | End: 2022-08-26

## 2022-08-26 RX ORDER — GLYCOPYRROLATE 0.2 MG/ML
INJECTION INTRAMUSCULAR; INTRAVENOUS AS NEEDED
Status: DISCONTINUED | OUTPATIENT
Start: 2022-08-26 | End: 2022-08-26

## 2022-08-26 RX ORDER — SODIUM CHLORIDE, SODIUM LACTATE, POTASSIUM CHLORIDE, CALCIUM CHLORIDE 600; 310; 30; 20 MG/100ML; MG/100ML; MG/100ML; MG/100ML
INJECTION, SOLUTION INTRAVENOUS CONTINUOUS PRN
Status: DISCONTINUED | OUTPATIENT
Start: 2022-08-26 | End: 2022-08-26

## 2022-08-26 RX ORDER — SODIUM CHLORIDE, SODIUM LACTATE, POTASSIUM CHLORIDE, CALCIUM CHLORIDE 600; 310; 30; 20 MG/100ML; MG/100ML; MG/100ML; MG/100ML
20 INJECTION, SOLUTION INTRAVENOUS CONTINUOUS
Status: DISCONTINUED | OUTPATIENT
Start: 2022-08-26 | End: 2022-08-30 | Stop reason: HOSPADM

## 2022-08-26 RX ORDER — PROPOFOL 10 MG/ML
INJECTION, EMULSION INTRAVENOUS AS NEEDED
Status: DISCONTINUED | OUTPATIENT
Start: 2022-08-26 | End: 2022-08-26

## 2022-08-26 RX ADMIN — LIDOCAINE HYDROCHLORIDE 80 MG: 10 INJECTION, SOLUTION EPIDURAL; INFILTRATION; INTRACAUDAL; PERINEURAL at 12:48

## 2022-08-26 RX ADMIN — PROPOFOL 50 MG: 10 INJECTION, EMULSION INTRAVENOUS at 13:02

## 2022-08-26 RX ADMIN — PROPOFOL 50 MG: 10 INJECTION, EMULSION INTRAVENOUS at 13:08

## 2022-08-26 RX ADMIN — PROPOFOL 50 MG: 10 INJECTION, EMULSION INTRAVENOUS at 12:58

## 2022-08-26 RX ADMIN — PROPOFOL 50 MG: 10 INJECTION, EMULSION INTRAVENOUS at 12:52

## 2022-08-26 RX ADMIN — PROPOFOL 40 MG: 10 INJECTION, EMULSION INTRAVENOUS at 12:55

## 2022-08-26 RX ADMIN — GLYCOPYRROLATE 0.1 MG: 0.2 INJECTION INTRAMUSCULAR; INTRAVENOUS at 12:48

## 2022-08-26 RX ADMIN — PROPOFOL 20 MG: 10 INJECTION, EMULSION INTRAVENOUS at 13:13

## 2022-08-26 RX ADMIN — PROPOFOL 40 MG: 10 INJECTION, EMULSION INTRAVENOUS at 13:05

## 2022-08-26 RX ADMIN — SODIUM CHLORIDE, SODIUM LACTATE, POTASSIUM CHLORIDE, CALCIUM CHLORIDE: 600; 310; 30; 20 INJECTION, SOLUTION INTRAVENOUS at 12:33

## 2022-08-26 RX ADMIN — PROPOFOL 20 MG: 10 INJECTION, EMULSION INTRAVENOUS at 13:15

## 2022-08-26 RX ADMIN — PROPOFOL 120 MG: 10 INJECTION, EMULSION INTRAVENOUS at 12:49

## 2022-08-26 NOTE — ANESTHESIA PREPROCEDURE EVALUATION
Procedure:  COLONOSCOPY  EGD    Relevant Problems   GI/HEPATIC   (+) GERD (gastroesophageal reflux disease)      +hiatel hernia  +hernández's esophagus  Physical Exam    Airway    Mallampati score: II  TM Distance: >3 FB  Neck ROM: full     Dental   No notable dental hx     Cardiovascular      Pulmonary      Other Findings        Anesthesia Plan  ASA Score- 2     Anesthesia Type- IV sedation with anesthesia with ASA Monitors  Additional Monitors:   Airway Plan:     Comment: 08:00 - last of PO bowel prep  Plan Factors-Exercise tolerance (METS): >4 METS  Chart reviewed  Patient summary reviewed  Patient is not a current smoker  Induction- intravenous  Postoperative Plan-     Informed Consent- Anesthetic plan and risks discussed with patient  I personally reviewed this patient with the CRNA  Discussed and agreed on the Anesthesia Plan with the CRNA  Nathen Monique

## 2022-08-26 NOTE — INTERVAL H&P NOTE
H&P reviewed  After examining the patient I find no changes in the patients condition since the H&P had been written      Vitals:    08/26/22 1217   BP: 124/69   Pulse: 94   Resp: 18   Temp: (!) 97 4 °F (36 3 °C)   SpO2: 99%

## 2022-08-26 NOTE — ANESTHESIA POSTPROCEDURE EVALUATION
Post-Op Assessment Note    CV Status:  Stable  Pain Score: 0    Pain management: adequate     Mental Status:  Alert, awake and sleepy   Hydration Status:  Euvolemic   PONV Controlled:  Controlled   Airway Patency:  Patent   Two or more mitigation strategies used for obstructive sleep apnea   Post Op Vitals Reviewed: Yes      Staff: CRNA         No complications documented      /72 (08/26/22 1324)    Temp     Pulse 86 (08/26/22 1324)   Resp 18 (08/26/22 1324)    SpO2 92 % (08/26/22 1324)

## 2022-09-06 ENCOUNTER — TELEPHONE (OUTPATIENT)
Dept: GASTROENTEROLOGY | Facility: CLINIC | Age: 67
End: 2022-09-06

## 2022-09-06 NOTE — TELEPHONE ENCOUNTER
----- Message from [employer] sent at 9/6/2022 11:23 AM EDT -----  Regarding: Results  Please call patient regarding his questions about Woodward's, thank you!

## 2023-01-03 DIAGNOSIS — K21.9 GASTROESOPHAGEAL REFLUX DISEASE, UNSPECIFIED WHETHER ESOPHAGITIS PRESENT: ICD-10-CM

## 2023-01-03 RX ORDER — PANTOPRAZOLE SODIUM 40 MG/1
TABLET, DELAYED RELEASE ORAL
Qty: 90 TABLET | Refills: 3 | Status: SHIPPED | OUTPATIENT
Start: 2023-01-03

## 2023-01-06 ENCOUNTER — TELEPHONE (OUTPATIENT)
Dept: FAMILY MEDICINE CLINIC | Facility: CLINIC | Age: 68
End: 2023-01-06
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
## 2023-01-06 NOTE — TELEPHONE ENCOUNTER
Patient called back  He states he would really like for his wife to be with him for this appointment  She is a teacher  They are asking if he can have his appointment any time on 1/16/23

## 2023-01-16 ENCOUNTER — OFFICE VISIT (OUTPATIENT)
Dept: FAMILY MEDICINE CLINIC | Facility: CLINIC | Age: 68
End: 2023-01-16

## 2023-01-16 VITALS
DIASTOLIC BLOOD PRESSURE: 78 MMHG | HEIGHT: 73 IN | TEMPERATURE: 97.8 F | HEART RATE: 81 BPM | OXYGEN SATURATION: 98 % | SYSTOLIC BLOOD PRESSURE: 132 MMHG | WEIGHT: 275.1 LBS | BODY MASS INDEX: 36.46 KG/M2

## 2023-01-16 DIAGNOSIS — G47.61 PERIODIC LIMB MOVEMENT DISORDER: Primary | ICD-10-CM

## 2023-01-16 RX ORDER — MELATONIN 10 MG
10 CAPSULE ORAL
COMMUNITY
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
## 2023-02-27 ENCOUNTER — OFFICE VISIT (OUTPATIENT)
Dept: FAMILY MEDICINE CLINIC | Facility: CLINIC | Age: 68
End: 2023-02-27

## 2023-02-27 VITALS
HEART RATE: 94 BPM | HEIGHT: 73 IN | OXYGEN SATURATION: 95 % | SYSTOLIC BLOOD PRESSURE: 122 MMHG | WEIGHT: 276.8 LBS | BODY MASS INDEX: 36.68 KG/M2 | TEMPERATURE: 97.6 F | DIASTOLIC BLOOD PRESSURE: 88 MMHG

## 2023-02-27 DIAGNOSIS — M25.562 LEFT KNEE PAIN, UNSPECIFIED CHRONICITY: ICD-10-CM

## 2023-02-27 DIAGNOSIS — M79.609 POPLITEAL PAIN: Primary | ICD-10-CM

## 2023-02-27 RX ORDER — PREDNISONE 10 MG/1
TABLET ORAL
Qty: 26 TABLET | Refills: 0 | Status: SHIPPED | OUTPATIENT
Start: 2023-02-27

## 2023-03-01 NOTE — PROGRESS NOTES
Patient ID: Fransico Chairez is a 74 y o  male  HPI: 74 y  o male presents for evaluation of both knees  For the past week, patient's right knee feels like there is a lot of pressure behind popliteal area and some achiness into calf on right without swelling  He denies any injury  He is finding its hard to bend his knee on the right due to the amount of swelling present  His left knee has stiffness and achiness, but no instability or locking  SUBJECTIVE    Family History   Problem Relation Age of Onset   • Valvular heart disease Mother    • Stroke Father    • Hyperlipidemia Father    • Macular degeneration Sister      Social History     Socioeconomic History   • Marital status: /Civil Union     Spouse name: Not on file   • Number of children: Not on file   • Years of education: Not on file   • Highest education level: Not on file   Occupational History   • Not on file   Tobacco Use   • Smoking status: Never   • Smokeless tobacco: Never   Vaping Use   • Vaping Use: Never used   Substance and Sexual Activity   • Alcohol use:  Yes     Alcohol/week: 5 0 standard drinks     Types: 5 Standard drinks or equivalent per week   • Drug use: Never   • Sexual activity: Yes     Partners: Female   Other Topics Concern   • Not on file   Social History Narrative   • Not on file     Social Determinants of Health     Financial Resource Strain: Not on file   Food Insecurity: Not on file   Transportation Needs: Not on file   Physical Activity: Not on file   Stress: Not on file   Social Connections: Not on file   Intimate Partner Violence: Not on file   Housing Stability: Not on file     Past Medical History:   Diagnosis Date   • Arthritis    • Woodward's esophagus    • GERD (gastroesophageal reflux disease)    • Hiatal hernia      Past Surgical History:   Procedure Laterality Date   • CHOLECYSTECTOMY     • CIRCUMCISION     • COLONOSCOPY     • EGD     • KNEE SURGERY     • TONSILLECTOMY       No Known Allergies    Current Outpatient Medications:   •  Misc Natural Products (Glucosamine Chond Complex/MSM) TABS, Take 3 capsules by mouth daily, Disp: , Rfl:   •  Multiple Vitamins-Minerals (ICAPS AREDS 2 PO), Take 2 capsules by mouth daily, Disp: , Rfl:   •  multivitamin (THERAGRAN) TABS, Take 1 tablet by mouth daily, Disp: , Rfl:   •  pantoprazole (PROTONIX) 40 mg tablet, TAKE 1 TABLET DAILY, Disp: 90 tablet, Rfl: 3  •  predniSONE 10 mg tablet, 3 tabs po bid x2 days, then 2 tabs po bid x2 days, then 1 tab bid x2 days, then 1 daily until done , Disp: 26 tablet, Rfl: 0  •  Zinc 100 MG TABS, Take by mouth, Disp: , Rfl:   •  Melatonin 10 MG CAPS, Take 10 mg by mouth (Patient not taking: Reported on 2/27/2023), Disp: , Rfl:     Review of Systems  Constitutional:     Denies fever, chills ,fatigue ,weakness ,weight loss, weight gain     ENT: Denies earache ,loss of hearing ,nosebleed, nasal discharge,nasal congestion ,sore throat ,hoarseness  Pulmonary: Denies shortness of breath ,cough  ,dyspnea on exertion, orthopnea  ,PND   Cardiovascular:  Denies bradycardia , tachycardia  ,palpations, lower extremity edema leg, claudication  Breast:  Denies new or changing breast lumps ,nipple discharge ,nipple changes  Abdomen:  Denies abdominal pain , anorexia , indigestion, nausea, vomiting, constipation, diarrhea  Musculoskeletal: Denies myalgias, arthralgias, joint swelling, joint stiffness , limb pain, limb swelling+ right knee popliteal swelling and swelling of knee with achiness into calf  Left knee stiffness and achiness     [place]: denies dysuria, polyuria  Skin: Denies skin rash, skin lesion, skin wound, itching, dry skin  Neuro: Denies headache, numbness, tingling, confusion, loss of consciousness, dizziness, vertigo  Psychiatric: Denies feelings of depression, suicidal ideation, anxiety, sleep disturbances    OBJECTIVE  /88   Pulse 94   Temp 97 6 °F (36 4 °C)   Ht 6' 1" (1 854 m)   Wt 126 kg (276 lb 12 8 oz)   SpO2 95%   BMI 36 52 kg/m²   Constitutional:   NAD, well appearing and well nourished      ENT:   Conjunctiva and lids: no injection, edema, or discharge     Pupils and iris: PATRICK bilaterally    External inspection of ears and nose: normal without deformities or discharge  Otoscopic exam: Canals patent without erythema  Nasal mucosa, septum and turbinates: Normal or edema or discharge         Oropharynx:  Moist mucosa, normal tongue and tonsils without lesions  No erythema        Pulmonary:Respiratory effort normal rate and rhythm, no increased work of breathing  Auscultation of lungs:  Clear bilaterally with no adventitious breath sounds       Cardiovascular: regular rate and rhythm, S1 and S2, no murmur, no edema and/or varicosities of LE      Abdomen: Soft and non-distended     Positive bowel sounds      No heptomegaly or splenomegaly      Gu: no suprapubic tenderness or CVA tenderness, no urethral discharge  Lymphatic:  No anterior or posterior cervical lymphadenopathy         Musculoskeletal:  Gait and station: Normal gait      Digits and nails normal without clubbing or cyanosis       Inspection/palpation of joints, bones, and muscles:   Left knee full rom + crepitance; right knee full rom with discomfort with extension + swelling behind popliteal fossa on right ; negative amanda's sign  Skin: Normal skin turgor and no rashes      Neuro:    Normal reflexes       Psych:   alert and oriented to person, place and time     normal mood and affect       Assessment/Plan:Diagnoses and all orders for this visit:    Popliteal pain  -     VAS lower limb venous duplex study, unilateral/limited; Future  -     predniSONE 10 mg tablet; 3 tabs po bid x2 days, then 2 tabs po bid x2 days, then 1 tab bid x2 days, then 1 daily until done  Left knee pain, unspecified chronicity  -     XR knee 3 vw left non injury; Future        Reviewed with patient plan to treat with above plan      Patient instructed to call in 72 hours if not feeling better or if symptoms worsen

## 2023-03-02 ENCOUNTER — HOSPITAL ENCOUNTER (OUTPATIENT)
Dept: VASCULAR ULTRASOUND | Facility: HOSPITAL | Age: 68
Discharge: HOME | End: 2023-03-02

## 2023-03-02 DIAGNOSIS — M79.609 POPLITEAL PAIN: ICD-10-CM

## 2023-03-09 DIAGNOSIS — M25.561 ACUTE PAIN OF RIGHT KNEE: Primary | ICD-10-CM

## 2023-03-11 ENCOUNTER — APPOINTMENT (OUTPATIENT)
Dept: RADIOLOGY | Facility: CLINIC | Age: 68
End: 2023-03-11

## 2023-03-11 DIAGNOSIS — M25.562 LEFT KNEE PAIN, UNSPECIFIED CHRONICITY: ICD-10-CM

## 2023-05-04 ENCOUNTER — OFFICE VISIT (OUTPATIENT)
Dept: FAMILY MEDICINE CLINIC | Facility: CLINIC | Age: 68
End: 2023-05-04

## 2023-05-04 VITALS
DIASTOLIC BLOOD PRESSURE: 78 MMHG | OXYGEN SATURATION: 95 % | HEIGHT: 73 IN | SYSTOLIC BLOOD PRESSURE: 122 MMHG | HEART RATE: 99 BPM | WEIGHT: 275 LBS | TEMPERATURE: 97.7 F | BODY MASS INDEX: 36.45 KG/M2

## 2023-05-04 DIAGNOSIS — Z00.00 ANNUAL PHYSICAL EXAM: Primary | ICD-10-CM

## 2023-05-04 DIAGNOSIS — R53.83 OTHER FATIGUE: ICD-10-CM

## 2023-05-04 DIAGNOSIS — N40.0 BENIGN PROSTATIC HYPERPLASIA WITHOUT LOWER URINARY TRACT SYMPTOMS: ICD-10-CM

## 2023-05-04 NOTE — PROGRESS NOTES
[address]    NAME: Navneet Brown  AGE: 74 y o  SEX: male  : 1955     DATE: 2023     Assessment and Plan:     Problem List Items Addressed This Visit    None      Immunizations and preventive care screenings were discussed with patient today  Appropriate education was printed on patient's after visit summary  Discussed risks and benefits of prostate cancer screening  We discussed the controversial history of PSA screening for prostate cancer in the [place] as well as the risk of over detection and over treatment of prostate cancer by way of PSA screening  The patient understands that PSA blood testing is an imperfect way to screen for prostate cancer and that elevated PSA levels in the blood may also be caused by infection, inflammation, prostatic trauma or manipulation, urological procedures, or by benign prostatic enlargement  The role of the digital rectal examination in prostate cancer screening was also discussed and I discussed with him that there is large interobserver variability in the findings of digital rectal examination  Counseling:  · Exercise: the importance of regular exercise/physical activity was discussed  Recommend exercise 3-5 times per week for at least 30 minutes  No follow-ups on file  Chief Complaint:     Chief Complaint   Patient presents with   • Physical Exam      History of Present Illness:     Adult Annual Physical   Patient here for a comprehensive physical exam  The patient reports no problems  Diet and Physical Activity  · Diet/Nutrition: well balanced diet  · Exercise: walking        Depression Screening  PHQ-2/9 Depression Screening    Little interest or pleasure in doing things: 0 - not at all  Feeling down, depressed, or hopeless: 0 - not at all  PHQ-2 Score: 0  PHQ-2 Interpretation: Negative depression screen       General Health  · Sleep: sleeps well    · Hearing: normal - bilateral   · Vision: no vision problems  · Dental: regular dental visits   Health  · Symptoms include: none     Review of Systems:     Review of Systems   Constitutional: Positive for fatigue  Negative for appetite change, chills and fever  HENT: Negative for ear pain, facial swelling, rhinorrhea, sinus pain, sore throat and trouble swallowing  Eyes: Negative for discharge and redness  Respiratory: Negative for chest tightness, shortness of breath and wheezing  Cardiovascular: Negative for chest pain and palpitations  Gastrointestinal: Negative for abdominal pain, diarrhea, nausea and vomiting  Endocrine: Negative for polyuria  Genitourinary: Negative for dysuria and urgency  Musculoskeletal: Negative for arthralgias and back pain  Skin: Negative for rash  Neurological: Negative for dizziness, weakness and headaches  Hematological: Negative for adenopathy  Psychiatric/Behavioral: Negative for behavioral problems, confusion and sleep disturbance  All other systems reviewed and are negative       Past Medical History:     Past Medical History:   Diagnosis Date   • Arthritis    • Woodward's esophagus    • GERD (gastroesophageal reflux disease)    • Hiatal hernia    • Kidney stone 1996   • Pneumonia 2017   • Shingles       Past Surgical History:     Past Surgical History:   Procedure Laterality Date   • CHOLECYSTECTOMY     • CIRCUMCISION     • COLONOSCOPY     • EGD     • KNEE SURGERY     • TONSILLECTOMY        Family History:     Family History   Problem Relation Age of Onset   • Valvular heart disease Mother    • Hearing loss Mother            • Stroke Father    • Hyperlipidemia Father    • Hearing loss Father            • Macular degeneration Sister    • Hearing loss Brother    • Hearing loss Brother       Social History:     Social History     Socioeconomic History   • Marital status: /Civil Union     Spouse name: None "  • Number of children: None   • Years of education: None   • Highest education level: None   Occupational History   • None   Tobacco Use   • Smoking status: Never     Passive exposure: Never   • Smokeless tobacco: Never   Vaping Use   • Vaping Use: Never used   Substance and Sexual Activity   • Alcohol use: Yes     Alcohol/week: 5 0 - 9 0 standard drinks     Types: 1 - 2 Glasses of wine, 1 - 2 Cans of beer, 3 - 5 Standard drinks or equivalent per week   • Drug use: Never   • Sexual activity: Yes     Partners: Female     Birth control/protection: Male Sterilization, Female Sterilization   Other Topics Concern   • None   Social History Narrative   • None     Social Determinants of Health     Financial Resource Strain: Not on file   Food Insecurity: Not on file   Transportation Needs: Not on file   Physical Activity: Not on file   Stress: Not on file   Social Connections: Not on file   Intimate Partner Violence: Not on file   Housing Stability: Not on file      Current Medications:     Current Outpatient Medications   Medication Sig Dispense Refill   • Misc Natural Products (Glucosamine Chond Complex/MSM) TABS Take 3 capsules by mouth daily     • Multiple Vitamins-Minerals (ICAPS AREDS 2 PO) Take 2 capsules by mouth daily     • multivitamin (THERAGRAN) TABS Take 1 tablet by mouth daily     • pantoprazole (PROTONIX) 40 mg tablet TAKE 1 TABLET DAILY 90 tablet 3   • Zinc 100 MG TABS Take by mouth       No current facility-administered medications for this visit  Allergies:     No Known Allergies   Physical Exam:     /78   Pulse 99   Temp 97 7 °F (36 5 °C)   Ht 6' 1\" (1 854 m)   Wt 125 kg (275 lb)   SpO2 95%   BMI 36 28 kg/m²     Physical Exam  Vitals and nursing note reviewed  Constitutional:       General: He is not in acute distress  Appearance: Normal appearance  He is not ill-appearing or diaphoretic  HENT:      Head: Normocephalic and atraumatic        Right Ear: Tympanic membrane, ear canal " and external ear normal       Left Ear: Tympanic membrane, ear canal and external ear normal       Nose: Nose normal  No congestion or rhinorrhea  Mouth/Throat:      Mouth: Mucous membranes are moist       Pharynx: Oropharynx is clear  No posterior oropharyngeal erythema  Eyes:      General:         Right eye: No discharge  Left eye: No discharge  Extraocular Movements: Extraocular movements intact  Conjunctiva/sclera: Conjunctivae normal       Pupils: Pupils are equal, round, and reactive to light  Neck:      Vascular: No carotid bruit  Cardiovascular:      Rate and Rhythm: Normal rate and regular rhythm  Pulses: Normal pulses  Heart sounds: Normal heart sounds  No murmur heard  Pulmonary:      Effort: Pulmonary effort is normal  No respiratory distress  Breath sounds: Normal breath sounds  No wheezing or rhonchi  Abdominal:      General: Abdomen is flat  Bowel sounds are normal  There is no distension  Palpations: There is no mass  Tenderness: There is no abdominal tenderness  Musculoskeletal:         General: No swelling or deformity  Normal range of motion  Cervical back: Normal range of motion and neck supple  No rigidity  Right lower leg: No edema  Left lower leg: No edema  Lymphadenopathy:      Cervical: No cervical adenopathy  Skin:     General: Skin is warm and dry  Capillary Refill: Capillary refill takes less than 2 seconds  Coloration: Skin is not jaundiced  Findings: No bruising, erythema or rash  Neurological:      General: No focal deficit present  Mental Status: He is alert and oriented to person, place, and time  Cranial Nerves: No cranial nerve deficit  Sensory: No sensory deficit  Gait: Gait normal       Deep Tendon Reflexes: Reflexes normal    Psychiatric:         Mood and Affect: Mood normal          Behavior: Behavior normal          Thought Content:  Thought content normal  Judgment: Judgment normal           [address]

## 2023-05-16 ENCOUNTER — TELEPHONE (OUTPATIENT)
Dept: FAMILY MEDICINE CLINIC | Facility: CLINIC | Age: 68
End: 2023-05-16

## 2023-05-16 ENCOUNTER — TELEMEDICINE (OUTPATIENT)
Dept: FAMILY MEDICINE CLINIC | Facility: CLINIC | Age: 68
End: 2023-05-16

## 2023-05-16 DIAGNOSIS — U07.1 COVID: Primary | ICD-10-CM

## 2023-05-16 RX ORDER — NIRMATRELVIR AND RITONAVIR 300-100 MG
3 KIT ORAL 2 TIMES DAILY
Qty: 30 TABLET | Refills: 0 | Status: SHIPPED | OUTPATIENT
Start: 2023-05-16 | End: 2023-05-21

## 2023-05-16 NOTE — TELEPHONE ENCOUNTER
Can you see today at 430? Arlin (leaves at 3 PM) and Noemi Hartman is full  Or should we offer something tomorrow?

## 2023-05-16 NOTE — TELEPHONE ENCOUNTER
Please see if patient wants to do video visit    Hi, this is Suzanne Pen  I'm patient with Doctor Fernando Koo, date of birth 1955  My wife is also patient  She had tested positive for COVID on Friday and then I tested myself last night and I'm also positive for COVID  So and it took me, you know, so I'm not feeling so good  Anyway, please give me a call back [phone]  Thank you

## 2023-05-16 NOTE — PROGRESS NOTES
COVID-19 Outpatient Progress Note    Assessment/Plan:    Problem List Items Addressed This Visit    None  Visit Diagnoses     COVID    -  Primary    Relevant Medications    nirmatrelvir & ritonavir (Paxlovid, 300/100,) tablet therapy pack         Disposition:     Patient has asymptomatic or mild COVID-19 infection  Based off CDC guidelines, they were recommended to isolate for 5 days  If they are asymptomatic or symptoms are improving with no fevers in the past 24 hours, isolation may be ended followed by 5 days of wearing a mask when around othes to minimize risk of infecting others  If still have a fever or other symptoms have not improved, continue to isolate until they improve  Regardless of when they end isolation, avoid being around people who are more likely to get very sick from COVID-19 until at least day 11  If COVID symptoms worsen after ending isolation, restart isolation at day 0  With two sequential negative antigen tests 48 hours apart, they may remove their mask sooner than day 10  Discussed symptom directed medication options with patient  Discussed vitamin D, vitamin C, and/or zinc supplementation with patient  Day 1 of COVID  Pt meets criteria for Paxlovid  Med called in  Also recommend Vit D, Vit C and zinc as directed  f/u Friday by phone - earlier if worse  Pt to call for problems or concerns in the interim    Patient meets criteria for PAXLOVID and they have been counseled appropriately according to EUA documentation released by the FDA  After discussion, patient agrees to treatment  Nancie Bañuelos is an investigational medicine used to treat mild-to-moderate COVID-19 in adults and children (15years of age and older weighing at least 80 pounds (40 kg)) with positive results of direct SARS-CoV-2 viral testing, and who are at high risk for progression to severe COVID-19, including hospitalization or death  PAXLOVID is investigational because it is still being studied   There is limited information about the safety and effectiveness of using PAXLOVID to treat people with mild-to-moderate COVID-19  The FDA has authorized the emergency use of PAXLOVID for the treatment of mild-tomoderate COVID-19 in adults and children (15years of age and older weighing at least 80 pounds (40 kg)) with a positive test for the virus that causes COVID-19, and who are at high risk for progression to severe COVID-19, including hospitalization or death, under an EUA  What should I tell my healthcare provider before I take PAXLOVID? Tell your healthcare provider if you:  - Have any allergies  - Have liver or kidney disease  - Are pregnant or plan to become pregnant  - Are breastfeeding a child  - Have any serious illnesses    Tell your healthcare provider about all the medicines you take, including prescription and over-the-counter medicines, vitamins, and herbal supplements  Some medicines may interact with PAXLOVID and may cause serious side effects  Keep a list of your medicines to show your healthcare provider and pharmacist when you get a new medicine  You can ask your healthcare provider or pharmacist for a list of medicines that interact with PAXLOVID  Do not start taking a new medicine without telling your healthcare provider  Your healthcare provider can tell you if it is safe to take PAXLOVID with other medicines  Tell your healthcare provider if you are taking combined hormonal contraceptive  PAXLOVID may affect how your birth control pills work  Females who are able to become pregnant should use another effective alternative form of contraception or an additional barrier method of contraception  Talk to your healthcare provider if you have any questions about contraceptive methods that might be right for you  How do I take PAXLOVID? PAXLOVID consists of 2 medicines: nirmatrelvir and ritonavir    - Take 2 pink tablets of nirmatrelvir with 1 white tablet of ritonavir by mouth 2 times each day (in the morning and in the evening) for 5 days  For each dose, take all 3 tablets at the same time  - If you have kidney disease, talk to your healthcare provider  You may need a different dose  - Swallow the tablets whole  Do not chew, break, or crush the tablets  - Take PAXLOVID with or without food  - Do not stop taking PAXLOVID without talking to your healthcare provider, even if you feel better  - If you miss a dose of PAXLOVID within 8 hours of the time it is usually taken, take it as soon as you remember  If you miss a dose by more than 8 hours, skip the missed dose and take the next dose at your regular time  Do not take 2 doses of PAXLOVID at the same time  - If you take too much PAXLOVID, call your healthcare provider or go to the nearest hospital emergency room right away  - If you are taking a ritonavir- or cobicistat-containing medicine to treat hepatitis C or Human Immunodeficiency Virus (HIV), you should continue to take your medicine as prescribed by your healthcare provider   - Talk to your healthcare provider if you do not feel better or if you feel worse after 5 days  Who should generally not take PAXLOVID? Do not take PAXLOVID if:  You are allergic to nirmatrelvir, ritonavir, or any of the ingredients in PAXLOVID  You are taking any of the following medicines:  - Alfuzosin  - Pethidine, piroxicam, propoxyphene  - Ranolazine  - Amiodarone, dronedarone, flecainide, propafenone, quinidine  - Colchicine  - Lurasidone, pimozide, clozapine  - Dihydroergotamine, ergotamine, methylergonovine  - Lovastatin, simvastatin  - Sildenafil (Revatio®) for pulmonary arterial hypertension (PAH)  - Triazolam, oral midazolam  - Apalutamide  - Carbamazepine, phenobarbital, phenytoin  - Rifampin  - St  Ac’s Wort (hypericum perforatum)    What are the important possible side effects of PAXLOVID? Possible side effects of PAXLOVID are:  - Liver Problems   Tell your healthcare provider right away if you have any of these signs and symptoms of liver problems: loss of appetite, yellowing of your skin and the whites of eyes (jaundice), dark-colored urine, pale colored stools and itchy skin, stomach area (abdominal) pain  - Resistance to HIV Medicines  If you have untreated HIV infection, PAXLOVID may lead to some HIV medicines not working as well in the future  - Other possible side effects include: altered sense of taste, diarrhea, high blood pressure, or muscle aches    These are not all the possible side effects of PAXLOVID  Not many people have taken PAXLOVID  Serious and unexpected side effects may happen  Yelena Moreira is still being studied, so it is possible that all of the risks are not known at this time  What other treatment choices are there? Like Jose Payne may allow for the emergency use of other medicines to treat people with COVID-19  Go to https://[employer]/ for information on the emergency use of other medicines that are authorized by FDA to treat people with COVID-19  Your healthcare provider may talk with you about clinical trials for which you may be eligible  It is your choice to be treated or not to be treated with PAXLOVID  Should you decide not to receive it or for your child not to receive it, it will not change your standard medical care  What if I am pregnant or breastfeeding? There is no experience treating pregnant women or breastfeeding mothers with PAXLOVID  For a mother and unborn baby, the benefit of taking PAXLOVID may be greater than the risk from the treatment  If you are pregnant, discuss your options and specific situation with your healthcare provider  It is recommended that you use effective barrier contraception or do not have sexual activity while taking PAXLOVID      If you are breastfeeding, discuss your options and specific situation with your healthcare provider  How do I report side effects with PAXLOVID? Contact your healthcare provider if you have any side effects that bother you or do not go away  Report side effects to FDA MedWatch at www fda gov/medwatch or call 3-263-SZP6455 or you can report side effects to [facility]  at the contact information provided below  Website Fax number Telephone number   [employer] [phone]     How should I store [address]? Store PAXLOVID tablets at room temperature between 68°F to 77°F (20°C to 25°C)  Full fact sheet for patients, parents, and caregivers can be found at: [employer] fernanda falcon    I have spent a total time of 10 minutes on the day of the encounter for this patient including discussing diagnostic results, discussing prognosis, risks and benefits of treatment options, instructions for management, patient and family education, importance of treatment compliance, risk factor reductions, impressions, counseling/coordination of care, documenting in the medical record, reviewing/ordering tests, medicine, procedures, obtaining or reviewing history and communicating with other healthcare professionals  Encounter provider: Wendi Watt MD     Provider located at: [address] [place] [phone]     Recent Visits  No visits were found meeting these conditions  Showing recent visits within past 7 days and meeting all other requirements  Today's Visits  Date Type Provider Dept   05/16/23 Telemedicine Wendi Watt MD [address] [place] [address]   05/16/23 Telephone [facility] today's visits and meeting all other requirements  Future Appointments  No visits were found meeting these conditions    Showing future appointments within next 150 days and meeting all other requirements     This virtual check-in was done via [employer] and patient was informed that this is a secure, HIPAA-compliant platform  He agrees to proceed  Patient agrees to participate in a virtual check in via telephone or video visit instead of presenting to the office to address urgent/immediate medical needs  Patient is aware this is a billable service  He acknowledged consent and understanding of privacy and security of the video platform  The patient has agreed to participate and understands they can discontinue the visit at any time  After connecting through [facility], the patient was identified by name and date of birth  Manasa Hyatt was informed that this was a telemedicine visit and that the exam was being conducted confidentially over secure lines  My office door was closed  No one else was in the room  Manasa Hyatt acknowledged consent and understanding of privacy and security of the telemedicine visit  I informed the patient that I have reviewed his record in Epic and presented the opportunity for him to ask any questions regarding the visit today  The patient agreed to participate  Verification of patient location:  Patient is located in the following state in which I hold an active license: [place]    Subjective:   Manasa Hyatt is a 74 y o  male who has been screened for COVID-19  Symptom change since last report: unchanged  Patient's symptoms include chills, fatigue, malaise, nasal congestion, rhinorrhea, cough, myalgias and headache  Patient denies fever, sore throat, anosmia, loss of taste, shortness of breath, chest tightness, abdominal pain, nausea, vomiting and diarrhea  - Date of symptom onset: 5/15/2023  - Date of exposure: 5/12/2023  - Date of positive COVID-19 test: 5/16/2023  Type of test: Home antigen  COVID-19 vaccination status: Fully vaccinated with booster    Juarez Delaney has been staying home and has isolated themselves in his home   He is taking care to not share personal items and is cleaning all surfaces that are touched often, like counters, tabletops, and doorknobs using household cleaning sprays or wipes  He is wearing a mask when he leaves his room  Day 1 of COVID symptoms as above    Lab Results   Component Value Date    SARSCORONAVI Not Detected 09/18/2021       Review of Systems   Constitutional: Positive for chills and fatigue  Negative for fever  HENT: Positive for congestion and rhinorrhea  Negative for sore throat  Respiratory: Positive for cough  Negative for chest tightness and shortness of breath  Gastrointestinal: Negative for abdominal pain, diarrhea, nausea and vomiting  Musculoskeletal: Positive for myalgias  Neurological: Positive for headaches  Current Outpatient Medications on File Prior to Visit   Medication Sig   • Misc Natural Products (Glucosamine Chond Complex/MSM) TABS Take 3 capsules by mouth daily   • Multiple Vitamins-Minerals (ICAPS AREDS 2 PO) Take 2 capsules by mouth daily   • multivitamin (THERAGRAN) TABS Take 1 tablet by mouth daily   • pantoprazole (PROTONIX) 40 mg tablet TAKE 1 TABLET DAILY   • Zinc 100 MG TABS Take by mouth       Objective: There were no vitals taken for this visit  Physical Exam  Constitutional:       Appearance: Normal appearance  HENT:      Head: Normocephalic  Nose: No congestion  Mouth/Throat:      Mouth: Mucous membranes are moist    Eyes:      Conjunctiva/sclera: Conjunctivae normal    Pulmonary:      Effort: Pulmonary effort is normal  No respiratory distress  Musculoskeletal:      Cervical back: Normal range of motion  Neurological:      Mental Status: He is alert and oriented to person, place, and time         Cristal Angelucci, MD

## 2023-05-17 NOTE — TELEPHONE ENCOUNTER
Patient called stating when he tested positive for COVID, he had a contractor working on his home  He is asking when it is safe for the worker to return to finish the job  Please advise

## 2023-07-25 ENCOUNTER — OFFICE VISIT (OUTPATIENT)
Dept: FAMILY MEDICINE CLINIC | Facility: CLINIC | Age: 68
End: 2023-07-25
Payer: COMMERCIAL

## 2023-07-25 VITALS
BODY MASS INDEX: 34.78 KG/M2 | HEIGHT: 74 IN | OXYGEN SATURATION: 98 % | SYSTOLIC BLOOD PRESSURE: 110 MMHG | WEIGHT: 271 LBS | HEART RATE: 80 BPM | DIASTOLIC BLOOD PRESSURE: 78 MMHG | TEMPERATURE: 96.6 F

## 2023-07-25 DIAGNOSIS — J06.9 ACUTE URI: Primary | ICD-10-CM

## 2023-07-25 PROCEDURE — 99214 OFFICE O/P EST MOD 30 MIN: CPT | Performed by: NURSE PRACTITIONER

## 2023-07-25 RX ORDER — AZITHROMYCIN 250 MG/1
TABLET, FILM COATED ORAL
Qty: 6 TABLET | Refills: 0 | Status: SHIPPED | OUTPATIENT
Start: 2023-07-25 | End: 2023-07-29

## 2023-07-25 NOTE — PROGRESS NOTES
Name: Ely Holt      :       MRN: [id number]  Encounter Provider: OTILIA Perdomo  Encounter Date: 2023   Encounter department: [address] [place] [address]     1. Acute URI  -     azithromycin (ZITHROMAX) 250 mg tablet; Take 2 tabs on Day 1 than 1 tab Days 2-5    Discussed with patient plan to treat with a course of azithromycin  Discussed with patient plan to have him take the guaifenesin-codeine that he has at home   Discussed with patient that if he is not improving in the next 72 hours plan to obtain chest x-ray. Patient instructed to call if no improvement in 72 hours or symptoms worsen       Subjective      67 y. o.male presenting with harsh cough for the past 6 days. He reports that the cough started on 2023 and it was not to bad dealing with it. The next day he developed chills and fatigue with the cough worsening. He reports he was able to function over the weekend but by 2023 he was coughing up thick grey mucous plugs. He was taking Dayquil and other prescription cough medications his wife had leftover from other illnesses. His wife has discoid lupus erythematosus so she is prone to upper respiratory infections so he wanted to be evalauted. Review of Systems   Constitutional: Positive for chills and fatigue. Negative for fever. HENT: Positive for congestion and postnasal drip. Negative for ear pain, sinus pressure, sinus pain and sore throat. Respiratory: Positive for cough and chest tightness. Negative for shortness of breath and wheezing. Cardiovascular: Negative. Gastrointestinal: Negative for abdominal distention, abdominal pain, diarrhea, nausea and vomiting. Musculoskeletal: Negative. Neurological: Negative. Psychiatric/Behavioral: Negative.         Current Outpatient Medications on File Prior to Visit   Medication Sig   • Misc Natural Products (Glucosamine Chond Complex/MSM) TABS Take 3 capsules by mouth daily   • Multiple Vitamins-Minerals (ICAPS AREDS 2 PO) Take 2 capsules by mouth daily   • multivitamin (THERAGRAN) TABS Take 1 tablet by mouth daily   • pantoprazole (PROTONIX) 40 mg tablet TAKE 1 TABLET DAILY   • Zinc 100 MG TABS Take by mouth       Objective     /78 (BP Location: Left arm, Patient Position: Sitting)   Pulse 80   Temp (!) 96.6 °F (35.9 °C)   Ht 6' 2" (1.88 m)   Wt 123 kg (271 lb)   SpO2 98%   BMI 34.79 kg/m²  (Reviewed)    Physical Exam  Vitals reviewed. Constitutional:       General: He is not in acute distress. Appearance: He is not ill-appearing. HENT:      Head: Normocephalic and atraumatic. Right Ear: Tympanic membrane, ear canal and external ear normal.      Left Ear: Tympanic membrane, ear canal and external ear normal.      Nose: Nose normal.      Mouth/Throat:      Mouth: Mucous membranes are moist.      Pharynx: Oropharynx is clear. Eyes:      Extraocular Movements: Extraocular movements intact. Conjunctiva/sclera: Conjunctivae normal.      Pupils: Pupils are equal, round, and reactive to light. Cardiovascular:      Rate and Rhythm: Normal rate and regular rhythm. Heart sounds: Normal heart sounds. Pulmonary:      Effort: Pulmonary effort is normal. No respiratory distress. Breath sounds: Rhonchi present. No wheezing or rales. Chest:      Chest wall: No tenderness. Musculoskeletal:      Cervical back: Neck supple. Skin:     General: Skin is warm and dry. Neurological:      Mental Status: He is alert and oriented to person, place, and time.    Psychiatric:         Mood and Affect: Mood normal.         Behavior: Behavior normal.       OTILIA Angel

## 2023-08-07 ENCOUNTER — OFFICE VISIT (OUTPATIENT)
Dept: FAMILY MEDICINE CLINIC | Facility: CLINIC | Age: 68
End: 2023-08-07
Payer: COMMERCIAL

## 2023-08-07 VITALS
DIASTOLIC BLOOD PRESSURE: 78 MMHG | HEART RATE: 85 BPM | SYSTOLIC BLOOD PRESSURE: 112 MMHG | HEIGHT: 74 IN | OXYGEN SATURATION: 97 % | TEMPERATURE: 97 F | BODY MASS INDEX: 34.72 KG/M2 | WEIGHT: 270.5 LBS

## 2023-08-07 DIAGNOSIS — R05.2 SUBACUTE COUGH: Primary | ICD-10-CM

## 2023-08-07 PROBLEM — M75.32 CALCIFIC TENDONITIS OF LEFT SHOULDER: Status: ACTIVE | Noted: 2023-07-11

## 2023-08-07 PROBLEM — M17.11 PRIMARY OSTEOARTHRITIS OF RIGHT KNEE: Status: ACTIVE | Noted: 2023-03-17

## 2023-08-07 PROCEDURE — 99214 OFFICE O/P EST MOD 30 MIN: CPT | Performed by: NURSE PRACTITIONER

## 2023-08-07 RX ORDER — METHYLPREDNISOLONE 4 MG/1
TABLET ORAL
Qty: 21 EACH | Refills: 0 | Status: SHIPPED | OUTPATIENT
Start: 2023-08-07

## 2023-08-07 RX ORDER — BENZONATATE 100 MG/1
100 CAPSULE ORAL 3 TIMES DAILY PRN
Qty: 20 CAPSULE | Refills: 0 | Status: SHIPPED | OUTPATIENT
Start: 2023-08-07

## 2023-08-07 NOTE — PROGRESS NOTES
Name: Sarah Medina      :       MRN: [id number]  Encounter Provider: OTILIA Lozada  Encounter Date: 2023   Encounter department: [address] [place] [address]     1. Subacute cough  -     methylPREDNISolone 4 MG tablet therapy pack; Use as directed on package  -     benzonatate (TESSALON PERLES) 100 mg capsule; Take 1 capsule (100 mg total) by mouth 3 (three) times a day as needed for cough    Discussed with patient plan to treat with a course of steroids and benzonatate   Patient instructed to call if no improvement in 72 hours or symptoms worsen       Subjective      67 y. o.male presenting with a lingering cough for the past two weeks. He was seen on 2023 for an acute upper respiratory infection and was treated with a course of azithromycin. He was also using guaifenesin-codeine that he had leftover from previous illness. He reports that he was feeling better after previous treatment but still had the lingering cough. He reports that he coughed up some thick yellow sputum today and his wife wanted him to be seen again due to her under lying conditions. He denies fever, chills, genralized body aches, shortness of breath or GI symptoms. Review of Systems   Constitutional: Negative. Negative for chills, fatigue and fever. HENT: Negative for congestion, ear pain, postnasal drip, rhinorrhea, sinus pressure, sinus pain, sneezing and sore throat. Respiratory: Positive for cough. Negative for chest tightness, shortness of breath and wheezing. Cardiovascular: Negative. Gastrointestinal: Negative. Musculoskeletal: Negative. Neurological: Negative. Psychiatric/Behavioral: Negative.         Current Outpatient Medications on File Prior to Visit   Medication Sig   • Misc Natural Products (Glucosamine Chond Complex/MSM) TABS Take 3 capsules by mouth daily   • Multiple Vitamins-Minerals (ICAPS AREDS 2 PO) Take 2 capsules by mouth daily   • multivitamin (THERAGRAN) TABS Take 1 tablet by mouth daily   • pantoprazole (PROTONIX) 40 mg tablet TAKE 1 TABLET DAILY   • Zinc 100 MG TABS Take by mouth       Objective     /78 (BP Location: Left arm, Patient Position: Sitting)   Pulse 85   Temp (!) 97 °F (36.1 °C)   Ht 6' 2" (1.88 m)   Wt 123 kg (270 lb 8 oz)   SpO2 97%   BMI 34.73 kg/m² (Reviewed)    Physical Exam  Vitals reviewed. Constitutional:       General: He is not in acute distress. Appearance: He is well-developed and well-groomed. He is not ill-appearing. HENT:      Head: Normocephalic and atraumatic. Right Ear: Tympanic membrane, ear canal and external ear normal.      Left Ear: Tympanic membrane, ear canal and external ear normal.      Nose: Nose normal.      Mouth/Throat:      Mouth: Mucous membranes are moist.      Pharynx: Oropharynx is clear. Eyes:      General: Lids are normal.      Extraocular Movements: Extraocular movements intact. Conjunctiva/sclera: Conjunctivae normal.      Pupils: Pupils are equal, round, and reactive to light. Neck:      Trachea: Trachea and phonation normal.   Cardiovascular:      Rate and Rhythm: Normal rate and regular rhythm. Heart sounds: Normal heart sounds. Pulmonary:      Effort: Pulmonary effort is normal. No respiratory distress. Breath sounds: Normal breath sounds. No wheezing or rhonchi. Skin:     General: Skin is warm and dry. Capillary Refill: Capillary refill takes less than 2 seconds. Neurological:      Mental Status: He is alert and oriented to person, place, and time. Psychiatric:         Mood and Affect: Mood normal.         Behavior: Behavior normal. Behavior is cooperative.        [address] [facility],[address] [place] [place]o

## 2023-09-15 ENCOUNTER — TELEPHONE (OUTPATIENT)
Age: 68
End: 2023-09-15

## 2023-09-15 NOTE — TELEPHONE ENCOUNTER
Patient called to confirm there are active lab orders in chart. Patient will try to have labs done on Monday.

## 2023-09-18 ENCOUNTER — APPOINTMENT (OUTPATIENT)
Dept: LAB | Facility: CLINIC | Age: 68
End: 2023-09-18
Payer: COMMERCIAL

## 2023-09-18 DIAGNOSIS — N40.0 BENIGN PROSTATIC HYPERPLASIA WITHOUT LOWER URINARY TRACT SYMPTOMS: ICD-10-CM

## 2023-09-18 DIAGNOSIS — R53.83 OTHER FATIGUE: ICD-10-CM

## 2023-09-18 DIAGNOSIS — Z00.00 ANNUAL PHYSICAL EXAM: ICD-10-CM

## 2023-09-18 LAB
ALBUMIN SERPL BCP-MCNC: 4.1 G/DL (ref 3.5–5)
ALP SERPL-CCNC: 74 U/L (ref 34–104)
ALT SERPL W P-5'-P-CCNC: 33 U/L (ref 7–52)
ANION GAP SERPL CALCULATED.3IONS-SCNC: 8 MMOL/L
AST SERPL W P-5'-P-CCNC: 28 U/L (ref 13–39)
BILIRUB SERPL-MCNC: 0.7 MG/DL (ref 0.2–1)
BUN SERPL-MCNC: 14 MG/DL (ref 5–25)
CALCIUM SERPL-MCNC: 9.1 MG/DL (ref 8.4–10.2)
CHLORIDE SERPL-SCNC: 102 MMOL/L (ref 96–108)
CHOLEST SERPL-MCNC: 143 MG/DL
CO2 SERPL-SCNC: 31 MMOL/L (ref 21–32)
CREAT SERPL-MCNC: 1.08 MG/DL (ref 0.6–1.3)
ERYTHROCYTE [DISTWIDTH] IN BLOOD BY AUTOMATED COUNT: 13.2 % (ref 11.6–15.1)
GFR SERPL CREATININE-BSD FRML MDRD: 71 ML/MIN/1.73SQ M
GLUCOSE P FAST SERPL-MCNC: 93 MG/DL (ref 65–99)
HCT VFR BLD AUTO: 49 % (ref 36.5–49.3)
HDLC SERPL-MCNC: 42 MG/DL
HGB BLD-MCNC: 15.9 G/DL (ref 12–17)
LDLC SERPL CALC-MCNC: 84 MG/DL (ref 0–100)
MCH RBC QN AUTO: 31.7 PG (ref 26.8–34.3)
MCHC RBC AUTO-ENTMCNC: 32.4 G/DL (ref 31.4–37.4)
MCV RBC AUTO: 98 FL (ref 82–98)
NONHDLC SERPL-MCNC: 101 MG/DL
PLATELET # BLD AUTO: 316 THOUSANDS/UL (ref 149–390)
PMV BLD AUTO: 9.6 FL (ref 8.9–12.7)
POTASSIUM SERPL-SCNC: 4.1 MMOL/L (ref 3.5–5.3)
PROT SERPL-MCNC: 7.2 G/DL (ref 6.4–8.4)
PSA SERPL-MCNC: 0.39 NG/ML (ref 0–4)
RBC # BLD AUTO: 5.01 MILLION/UL (ref 3.88–5.62)
SODIUM SERPL-SCNC: 141 MMOL/L (ref 135–147)
TRIGL SERPL-MCNC: 83 MG/DL
TSH SERPL DL<=0.05 MIU/L-ACNC: 2.52 UIU/ML (ref 0.45–4.5)
WBC # BLD AUTO: 6.83 THOUSAND/UL (ref 4.31–10.16)

## 2023-09-18 PROCEDURE — 85027 COMPLETE CBC AUTOMATED: CPT

## 2023-09-18 PROCEDURE — 36415 COLL VENOUS BLD VENIPUNCTURE: CPT

## 2023-09-18 PROCEDURE — 80061 LIPID PANEL: CPT

## 2023-09-18 PROCEDURE — 84153 ASSAY OF PSA TOTAL: CPT

## 2023-09-18 PROCEDURE — 84443 ASSAY THYROID STIM HORMONE: CPT

## 2023-09-18 PROCEDURE — 80053 COMPREHEN METABOLIC PANEL: CPT

## 2023-11-18 ENCOUNTER — OFFICE VISIT (OUTPATIENT)
Dept: URGENT CARE | Facility: CLINIC | Age: 68
End: 2023-11-18
Payer: COMMERCIAL

## 2023-11-18 VITALS
OXYGEN SATURATION: 98 % | SYSTOLIC BLOOD PRESSURE: 115 MMHG | RESPIRATION RATE: 20 BRPM | DIASTOLIC BLOOD PRESSURE: 76 MMHG | TEMPERATURE: 97.3 F | HEART RATE: 78 BPM

## 2023-11-18 DIAGNOSIS — L01.03 BULLOUS IMPETIGO: Primary | ICD-10-CM

## 2023-11-18 PROCEDURE — 99213 OFFICE O/P EST LOW 20 MIN: CPT | Performed by: NURSE PRACTITIONER

## 2023-11-18 NOTE — PATIENT INSTRUCTIONS
--Apply Rx topical ointment as prescribed  --Avoid touching areas of weeping, covering if necessary  --Follow-up with PCP or dermatologist if not resolved in the next 1-2 weeks. Should be seen sooner for any worsening.

## 2023-12-21 DIAGNOSIS — K21.9 GASTROESOPHAGEAL REFLUX DISEASE, UNSPECIFIED WHETHER ESOPHAGITIS PRESENT: ICD-10-CM

## 2023-12-22 RX ORDER — PANTOPRAZOLE SODIUM 40 MG/1
TABLET, DELAYED RELEASE ORAL
Qty: 90 TABLET | Refills: 1 | Status: SHIPPED | OUTPATIENT
Start: 2023-12-22

## 2024-01-08 ENCOUNTER — NURSE TRIAGE (OUTPATIENT)
Age: 69
End: 2024-01-08

## 2024-01-08 ENCOUNTER — OFFICE VISIT (OUTPATIENT)
Dept: FAMILY MEDICINE CLINIC | Facility: CLINIC | Age: 69
End: 2024-01-08
Payer: COMMERCIAL

## 2024-01-08 VITALS
TEMPERATURE: 98.3 F | DIASTOLIC BLOOD PRESSURE: 82 MMHG | SYSTOLIC BLOOD PRESSURE: 120 MMHG | HEART RATE: 83 BPM | OXYGEN SATURATION: 97 % | BODY MASS INDEX: 35.68 KG/M2 | WEIGHT: 278 LBS | HEIGHT: 74 IN

## 2024-01-08 DIAGNOSIS — H93.13 TINNITUS OF BOTH EARS: Primary | ICD-10-CM

## 2024-01-08 PROCEDURE — 99213 OFFICE O/P EST LOW 20 MIN: CPT | Performed by: FAMILY MEDICINE

## 2024-01-08 NOTE — TELEPHONE ENCOUNTER
"Reason for Disposition   MODERATE-SEVERE tinnitus (i.e., interferes with work, school, or sleep)    Answer Assessment - Initial Assessment Questions  1. DESCRIPTION: \"Describe the sound you are hearing.\" (e.g., hissing, humming, pounding, ringing)      Ringing sound,high pitched    2. LOCATION: \"One or both ears?\" If one, ask: \"Which ear?\"  B/l ears more the right ear   3. SEVERITY: \"How bad is it?\"     - MILD - doesn't interfere with normal activities, only can hear in a quiet room     - MODERATE-SEVERE (Bothersome): interferes with work, school, sleep, or other activities       Moderate -severe  4. ONSET: \"When did this begin?\" \"Did it start suddenly or come on gradually?\"      A few weeks ago  5. PATTERN: \"Does this come and go, or has it been constant since it started?\"      Constant   6. HEARING LOSS: \"Is your hearing decreased?\" (e.g., normal, decreased)        Hearing normal   7. OTHER SYMPTOMS: \"Do you have any other symptoms?\" (e.g., dizziness, earache)      Patient  denies dizziness,earache    Protocols used: Tinnitus-ADULT-[place]    "

## 2024-01-08 NOTE — PROGRESS NOTES
"Assessment/Plan:    No problem-specific Assessment & Plan notes found for this encounter.       Diagnoses and all orders for this visit:    Tinnitus of both ears  -     Ambulatory Referral to Otolaryngology; Future  -     Ambulatory Referral to Audiology; Future     No red flag symptoms warranting imaging      Subjective:      Patient ID: Manuel Dumont is a 68 y.o. male.    HPI Patient presents with a few weeks of tinnitus. He notes it seems it's always there now but can oscillate in intensity. No associated dizziness, headaches, or hearing loss. He did fly to [place] over [place] but isn't sure if it was present before that.     The following portions of the patient's history were reviewed and updated as appropriate: allergies, current medications, past medical history, past social history, and problem list.    Review of Systems   Constitutional:  Negative for appetite change, diaphoresis and fever.   HENT:  Positive for tinnitus. Negative for congestion, ear discharge, ear pain and sore throat.    Respiratory:  Negative for cough.    All other systems reviewed and are negative.        Objective:      /82   Pulse 83   Temp 98.3 °F (36.8 °C)   Ht 6' 2\" (1.88 m)   Wt 126 kg (278 lb)   SpO2 97%   BMI 35.69 kg/m²          Physical Exam  Vitals and nursing note reviewed.   Constitutional:       General: He is not in acute distress.     Appearance: He is well-developed. He is not diaphoretic.   HENT:      Head: Normocephalic and atraumatic.      Right Ear: External ear normal. No middle ear effusion. Tympanic membrane is not erythematous or bulging.      Left Ear: External ear normal.  No middle ear effusion. Tympanic membrane is not erythematous or bulging.      Ears:      Comments: Cerumen present bilaterally without impaction   Eyes:      Conjunctiva/sclera: Conjunctivae normal.   Pulmonary:      Effort: Pulmonary effort is normal. No respiratory distress.   Skin:     Findings: No rash.   Neurological:    "   Mental Status: He is alert.      Cranial Nerves: No cranial nerve deficit.

## 2024-05-07 ENCOUNTER — OFFICE VISIT (OUTPATIENT)
Dept: FAMILY MEDICINE CLINIC | Facility: CLINIC | Age: 69
End: 2024-05-07
Payer: COMMERCIAL

## 2024-05-07 VITALS
BODY MASS INDEX: 35.47 KG/M2 | OXYGEN SATURATION: 96 % | WEIGHT: 276.4 LBS | TEMPERATURE: 97.6 F | HEART RATE: 92 BPM | HEIGHT: 74 IN | SYSTOLIC BLOOD PRESSURE: 130 MMHG | DIASTOLIC BLOOD PRESSURE: 80 MMHG

## 2024-05-07 DIAGNOSIS — N40.0 BENIGN PROSTATIC HYPERPLASIA WITHOUT LOWER URINARY TRACT SYMPTOMS: ICD-10-CM

## 2024-05-07 DIAGNOSIS — M25.561 RIGHT KNEE PAIN, UNSPECIFIED CHRONICITY: ICD-10-CM

## 2024-05-07 DIAGNOSIS — Z00.00 ANNUAL PHYSICAL EXAM: Primary | ICD-10-CM

## 2024-05-07 PROCEDURE — 99213 OFFICE O/P EST LOW 20 MIN: CPT | Performed by: FAMILY MEDICINE

## 2024-05-07 PROCEDURE — 99397 PER PM REEVAL EST PAT 65+ YR: CPT | Performed by: FAMILY MEDICINE

## 2024-05-09 NOTE — PROGRESS NOTES
ADULT ANNUAL PHYSICAL  [facility] [place]    NAME: Manuel Dumont  AGE: 68 y.o. SEX: male  : 1955     DATE: 2024     Assessment and Plan:     Problem List Items Addressed This Visit    None  Visit Diagnoses       Annual physical exam    -  Primary    Relevant Orders    Comprehensive metabolic panel    Lipid panel    Right knee pain, unspecified chronicity        Relevant Orders    Ambulatory Referral to Physical Therapy    Benign prostatic hyperplasia without lower urinary tract symptoms        Relevant Orders    PSA Total, Diagnostic            Immunizations and preventive care screenings were discussed with patient today. Appropriate education was printed on patient's after visit summary.    Discussed risks and benefits of prostate cancer screening. We discussed the controversial history of PSA screening for prostate cancer in the [place] as well as the risk of over detection and over treatment of prostate cancer by way of PSA screening.  The patient understands that PSA blood testing is an imperfect way to screen for prostate cancer and that elevated PSA levels in the blood may also be caused by infection, inflammation, prostatic trauma or manipulation, urological procedures, or by benign prostatic enlargement.    The role of the digital rectal examination in prostate cancer screening was also discussed and I discussed with him that there is large interobserver variability in the findings of digital rectal examination.    Counseling:  Exercise: the importance of regular exercise/physical activity was discussed. Recommend exercise 3-5 times per week for at least 30 minutes.          Return in about 1 year (around 2025).     Chief Complaint:     Chief Complaint   Patient presents with    Annual Exam     Annual Exam       History of Present Illness:     Adult Annual Physical   Patient here for a comprehensive physical exam. The patient  reports no problems.    Diet and Physical Activity  Diet/Nutrition: well balanced diet.   Exercise: walking.      Depression Screening  PHQ-2/9 Depression Screening    Little interest or pleasure in doing things: 0 - not at all  Feeling down, depressed, or hopeless: 0 - not at all  PHQ-2 Score: 0  PHQ-2 Interpretation: Negative depression screen       General Health  Sleep: sleeps well.   Hearing: normal - bilateral.  Vision: no vision problems.   Dental: regular dental visits.        Health  Symptoms include: none    Advanced Care Planning  Do you have an advanced directive? yes  Do you have a durable medical power of ? yes  ACP document given to patient? yes     Review of Systems:     Review of Systems   Constitutional:  Negative for appetite change, chills and fever.   HENT:  Negative for ear pain, facial swelling, rhinorrhea, sinus pain, sore throat and trouble swallowing.    Eyes:  Negative for discharge and redness.   Respiratory:  Negative for chest tightness, shortness of breath and wheezing.    Cardiovascular:  Negative for chest pain and palpitations.   Gastrointestinal:  Negative for abdominal pain, diarrhea, nausea and vomiting.   Endocrine: Negative for polyuria.   Genitourinary:  Negative for dysuria and urgency.   Musculoskeletal:  Positive for arthralgias. Negative for back pain.        + chronic right knee pain and stiffness   Skin:  Negative for rash.   Neurological:  Negative for dizziness, weakness and headaches.   Hematological:  Negative for adenopathy.   Psychiatric/Behavioral:  Negative for behavioral problems, confusion and sleep disturbance.    All other systems reviewed and are negative.     Past Medical History:     Past Medical History:   Diagnosis Date    Arthritis     Woodward's esophagus     Ear problems     GERD (gastroesophageal reflux disease)     Hiatal hernia     Kidney stone Jan 1996    Pneumonia Nov 2017    Shingles 2010    Sleep apnea     Sleep difficulties      Tinnitus       Past Surgical History:     Past Surgical History:   Procedure Laterality Date    CHOLECYSTECTOMY      CIRCUMCISION      COLONOSCOPY      EGD      KNEE SURGERY      TONSILLECTOMY        Family History:     Family History   Problem Relation Age of Onset    Valvular heart disease Mother     Hearing loss Mother             Stroke Father     Hyperlipidemia Father     Hearing loss Father             Macular degeneration Sister     Hearing loss Brother     Hearing loss Brother       Social History:     Social History     Socioeconomic History    Marital status: /Civil Union     Spouse name: None    Number of children: None    Years of education: None    Highest education level: None   Occupational History    None   Tobacco Use    Smoking status: Never     Passive exposure: Never    Smokeless tobacco: Never   Vaping Use    Vaping status: Never Used   Substance and Sexual Activity    Alcohol use: Yes     Alcohol/week: 5.0 - 9.0 standard drinks of alcohol     Types: 1 - 2 Glasses of wine, 1 - 2 Cans of beer, 3 - 5 Standard drinks or equivalent per week    Drug use: Never    Sexual activity: Yes     Partners: Female     Birth control/protection: Male Sterilization, Female Sterilization   Other Topics Concern    None   Social History Narrative    None     Social Determinants of Health     Financial Resource Strain: Not on file   Food Insecurity: No Food Insecurity (2024)    Hunger Vital Sign     Worried About Running Out of Food in the Last Year: Never true     Ran Out of Food in the Last Year: Never true   Transportation Needs: No Transportation Needs (2024)    PRAPARE - Transportation     Lack of Transportation (Medical): No     Lack of Transportation (Non-Medical): No   Physical Activity: Not on file   Stress: Not on file   Social Connections: Not on file   Intimate Partner Violence: Not on file   Housing Stability: Low Risk  (2024)    Housing Stability Vital Sign     Unable  "to Pay for Housing in the Last Year: No     Number of Places Lived in the Last Year: 1     Unstable Housing in the Last Year: No      Current Medications:     Current Outpatient Medications   Medication Sig Dispense Refill    Misc Natural Products (Glucosamine Chond Complex/MSM) TABS Take 3 capsules by mouth daily      Multiple Vitamins-Minerals (ICAPS AREDS 2 PO) Take 2 capsules by mouth daily      multivitamin (THERAGRAN) TABS Take 1 tablet by mouth daily      pantoprazole (PROTONIX) 40 mg tablet TAKE 1 TABLET DAILY 90 tablet 1    Zinc 100 MG TABS Take by mouth      mupirocin (BACTROBAN) 2 % ointment Apply topically 3 (three) times a day 22 g 0     No current facility-administered medications for this visit.      Allergies:     No Known Allergies   Physical Exam:     /80 (BP Location: Right arm, Patient Position: Sitting, Cuff Size: Standard)   Pulse 92   Temp 97.6 °F (36.4 °C)   Ht 6' 2\" (1.88 m)   Wt 125 kg (276 lb 6.4 oz)   SpO2 96%   BMI 35.49 kg/m²     Physical Exam  Vitals and nursing note reviewed.   Constitutional:       General: He is not in acute distress.     Appearance: Normal appearance. He is not ill-appearing or diaphoretic.   HENT:      Head: Normocephalic and atraumatic.      Right Ear: Tympanic membrane, ear canal and external ear normal.      Left Ear: Tympanic membrane, ear canal and external ear normal.      Nose: Nose normal. No congestion or rhinorrhea.      Mouth/Throat:      Mouth: Mucous membranes are moist.      Pharynx: Oropharynx is clear. No posterior oropharyngeal erythema.   Eyes:      General:         Right eye: No discharge.         Left eye: No discharge.      Extraocular Movements: Extraocular movements intact.      Conjunctiva/sclera: Conjunctivae normal.      Pupils: Pupils are equal, round, and reactive to light.   Neck:      Vascular: No carotid bruit.   Cardiovascular:      Rate and Rhythm: Normal rate and regular rhythm.      Pulses: Normal pulses.      Heart " sounds: Normal heart sounds. No murmur heard.  Pulmonary:      Effort: Pulmonary effort is normal. No respiratory distress.      Breath sounds: Normal breath sounds. No wheezing or rhonchi.   Abdominal:      General: Abdomen is flat. Bowel sounds are normal. There is no distension.      Palpations: There is no mass.      Tenderness: There is no abdominal tenderness.   Musculoskeletal:         General: Tenderness present. No swelling or deformity. Normal range of motion.      Cervical back: Normal range of motion and neck supple. No rigidity.      Right lower leg: No edema.      Left lower leg: No edema.      Comments: + crepitance  and deformity of right knee with varus deformity   Lymphadenopathy:      Cervical: No cervical adenopathy.   Skin:     General: Skin is warm and dry.      Capillary Refill: Capillary refill takes less than 2 seconds.      Coloration: Skin is not jaundiced.      Findings: No bruising, erythema or rash.   Neurological:      General: No focal deficit present.      Mental Status: He is alert and oriented to person, place, and time.      Cranial Nerves: No cranial nerve deficit.      Sensory: No sensory deficit.      Gait: Gait normal.      Deep Tendon Reflexes: Reflexes normal.   Psychiatric:         Mood and Affect: Mood normal.         Behavior: Behavior normal.         Thought Content: Thought content normal.         Judgment: Judgment normal.          Maria Luz Cedillo DO  [facility]    Answers submitted by the patient for this visit:  AUDIT (Alcohol Use Disorders Identification Test) Screening (Submitted on 5/7/2024)  How often during the last year have you found that you were not able to stop drinking once you had started?: 0 - never  How often during the last year have you failed to do what was normally expected from you because of drinking?: 0 - never  How often during the last year have you needed a first drink in the morning to get yourself going after  a heavy drinking session?: 0 - never  How often during the last year have you had a feeling of guilt or remorse after drinking?: 0 - never  How often during the last year have you been unable to remember what happened the night before because you had been drinking?: 0 - never  Have you or someone else been injured as a result of your drinking?: 0 - no  Has a relative or friend or a doctor or another health worker been concerned about your drinking or suggested you cut down?: 0 - no  Medicare Annual Wellness Visit (Submitted on 5/7/2024)  How would you rate your overall health?: excellent  Compared to last year, how is your physical health?: slightly worse  In general, how satisfied are you with your life?: very satisfied  Compared to last year, how is your eyesight?: same  Compared to last year, how is your hearing?: same  Compared to last year, how is your emotional/mental health?: same  How often is anger a problem for you?: never, rarely  How often do you feel unusually tired/fatigued?: never, rarely  In the past 7 days, how much pain have you experienced?: none  In the past 6 months, have you lost or gained 10 pounds without trying?: No  One or more falls in the last year: No  Do you have trouble with the stairs inside or outside your home?: No  Does your home have working smoke alarms?: Yes  Does your home have a carbon monoxide monitor?: Yes  Which safety hazards (if any) have you experienced in your home? Please select all that apply.: none  How would you describe your current diet? Please select all that apply.: Regular  In addition to prescription medications, are you taking any over-the-counter supplements?: Yes  If yes, what supplements are you taking?: Glucosomine, AREDS2 vitamins, multi-vitamin, zinc  Can you manage your medications?: Yes  Are you currently taking any opioid medications?: No  Can you walk and transfer into and out of your bed and chair?: Yes  Can you dress and groom yourself?: Yes  Can  you bathe or shower yourself?: Yes  Can you feed yourself?: Yes  Can you do your laundry/ housekeeping?: Yes  Can you manage your money, pay your bills, and track your expenses?: Yes  Can you make your own meals?: Yes  Can you do your own shopping?: Yes  Please list your DME (Durable Medical Equipment) supplier, if you use one.: [facility]  Within the last 12 months, have you had any hospitalizations or Emergency Department visits?: No  Do you have a living will?: No  Do you have a Durable POA (Power of ) for healthcare decisions?: No  Do you have an Advanced Directive for end of life decisions?: No  How often have you used an illegal drug (including marijuana) or a prescription medication for non-medical reasons in the past year?: never  What is the typical number of drinks you consume in a day?: 1  What is the typical number of drinks you consume in a week?: 6  How often did you have a drink containing alcohol in the past year?: 4 or more times a week  How many drinks did you have on a typical day  when you were drinking in the past year?: 1 to 2  How often did you have 6 or more drinks on one occasion in the past year?: never

## 2024-05-30 ENCOUNTER — EVALUATION (OUTPATIENT)
Dept: PHYSICAL THERAPY | Facility: CLINIC | Age: 69
End: 2024-05-30
Payer: COMMERCIAL

## 2024-05-30 DIAGNOSIS — M25.561 RIGHT KNEE PAIN, UNSPECIFIED CHRONICITY: ICD-10-CM

## 2024-05-30 PROCEDURE — 97161 PT EVAL LOW COMPLEX 20 MIN: CPT

## 2024-05-30 PROCEDURE — 97530 THERAPEUTIC ACTIVITIES: CPT

## 2024-05-30 NOTE — PROGRESS NOTES
PT Evaluation     Today's date: 2024  Patient name: Manuel Dumont  : 1955  MRN: [id number]  Referring provider: Lor Blackman*  Dx:   Encounter Diagnosis     ICD-10-CM    1. Right knee pain, unspecified chronicity  M25.561 Ambulatory Referral to Physical Therapy          Start Time: 1730  Stop Time:   Total time in clinic (min): 41 minutes    Assessment  Impairments: abnormal gait, abnormal or restricted ROM, impaired balance, impaired physical strength, lacks appropriate home exercise program, pain with function and poor body mechanics    Assessment details: Manuel Dumont is a 68 y.o. male who presents with signs and symptoms consistent of knee OA. Patient presents with pain, decreased strength, decreased ROM, decreased joint mobility, and ambulatory dysfunction. Due to these impairments, Patient has difficulty performing ambulating, prolonged standing, squatting, weight bearing over involved LE, and stair negotiating . Patient would benefit from skilled physical therapy to address the impairments, improve their level of function, and to improve their overall quality of life. Reviewed HEP, involved anatomy, physio as well as POC with verbalized understanding and pt is in agreement with above.     Plan  Patient would benefit from: skilled physical therapy  Planned modality interventions: cryotherapy and thermotherapy: hydrocollator packs    Planned therapy interventions: abdominal trunk stabilization, ADL training, balance, body mechanics training, home exercise program, functional ROM exercises, flexibility, therapeutic exercise, therapeutic activities, stretching, strengthening, joint mobilization, manual therapy, neuromuscular re-education, patient education, postural training, IASTM, kinesiology taping and massage    Frequency: 2x week  Duration in visits: 16  Duration in weeks: 8  Treatment plan discussed with: patient      Subjective Evaluation    History of Present  Illness  Mechanism of injury: History: Pt presents to PT with a knee brace that he was given about a year ago to correct a natural bow that he had in his knee. Pt likes to play basketball and ski however hasn't been able to do either activity due to the knee pain. Pt notes the other knee feels fine however presents with the same valgus deformity. Pt feels that his knee bothers him most at random times including in sit to stand motions, squatting, steps. Pt denies it waking him up at night.    Aggravating factors: standing, squatting, steps  Relieving factors: rest, sitting  Imaging: none  Occupation: desk work  Status: FT (upcoming penitentiary)  Hobbies/recreation: basketball, skiing  Patient Goals  Patient goals for therapy: increased strength and decreased pain    Pain  Current pain ratin  At best pain ratin  At worst pain ratin  Quality: sharp  Aggravating factors: walking, standing, stair climbing and lifting    Social Support  Steps to enter house: yes      Diagnostic Tests  No diagnostic tests performed  Treatments  Current treatment: physical therapy      Objective     Static Posture     Knee   Genu valgus.     Tenderness     Right Knee   Tenderness in the lateral joint line and medial joint line.     Active Range of Motion   Left Knee   Flexion: 125 degrees   Extension: 0 degrees     Right Knee   Flexion: 122 degrees   Extension: -5 degrees     Mobility   Patellar Mobility:     Right Knee   Hypomobile: lateral, superior and inferior     Strength/Myotome Testing     Right Knee   Flexion: 4-  Extension: 4    Tests     Right Knee   Positive patellar apprehension and patellar compression.   Negative anterior drawer, posterior drawer, valgus stress test at 0 degrees, valgus stress test at 30 degrees, varus stress test at 0 degrees and varus stress test at 30 degrees.       moderate HS tightness bilaterally  B valgus deformity with brace for RLE correcting degree     Precautions: standard      Manuals  "5/30            Jt gayle araujo                                       Neuro Re-Ed             steamboats             Retro walkouts             Lat walkouts                                                                 Ther Ex             IR  RTB 15x            LAQ             HS curls             NuStep             HS S             S/L clams             bridges 3\"X15                         Ther Activity             Pt edu: involved anatomy, physio, goals, POC, HEP RE                         Gait Training                                       Modalities                                            "

## 2024-06-04 ENCOUNTER — OFFICE VISIT (OUTPATIENT)
Dept: PHYSICAL THERAPY | Facility: CLINIC | Age: 69
End: 2024-06-04
Payer: COMMERCIAL

## 2024-06-04 DIAGNOSIS — M25.561 RIGHT KNEE PAIN, UNSPECIFIED CHRONICITY: Primary | ICD-10-CM

## 2024-06-04 PROCEDURE — 97140 MANUAL THERAPY 1/> REGIONS: CPT

## 2024-06-04 PROCEDURE — 97110 THERAPEUTIC EXERCISES: CPT

## 2024-06-04 NOTE — PROGRESS NOTES
"Daily Note     Today's date: 2024  Patient name: Manuel Dumont  : 1955  MRN: [id number]  Referring provider: Lor Blackman*  Dx:   Encounter Diagnosis     ICD-10-CM    1. Right knee pain, unspecified chronicity  M25.561           Start Time: 1616  Stop Time: 1656  Total time in clinic (min): 40 minutes    Subjective: Pt presents reporting he has been feeling good with the HEP and hasn't had any pain or soreness.      Objective: See treatment diary below      Assessment: Tolerated treatment well. Patient demonstrated fatigue post treatment, exhibited good technique with therapeutic exercises, and would benefit from continued PT. Pt was challenged a little with the additions and did notice a difference in strength R vs L. Advised pt he may be a little sore after today's session. Will look to add some of these progressions to HEP per pt tolerance.      Plan: Continue per plan of care.  Progress treatment as tolerated.       Precautions: standard      Manuals            Jt mobs  GII/GIII           Pat mobs  GII/GIII                                     Neuro Re-Ed             steamboats             Retro walkouts             Lat walkouts                                                                 Ther Ex             IR  RTB 15x            LAQ SL  22# 15x           HS curls SL  22# 15x           NuStep  L3 5'           HS S             S/L clams  3\"x15           bridges 3\"X15 3\"x20           SLR flexion  15x B           Ther Activity             Pt edu: involved anatomy, physio, goals, POC, HEP RE                         Gait Training                                       Modalities                                            "

## 2024-06-06 ENCOUNTER — OFFICE VISIT (OUTPATIENT)
Dept: PHYSICAL THERAPY | Facility: CLINIC | Age: 69
End: 2024-06-06
Payer: COMMERCIAL

## 2024-06-06 DIAGNOSIS — M25.561 RIGHT KNEE PAIN, UNSPECIFIED CHRONICITY: Primary | ICD-10-CM

## 2024-06-06 PROCEDURE — 97110 THERAPEUTIC EXERCISES: CPT

## 2024-06-06 NOTE — PROGRESS NOTES
"Daily Note     Today's date: 2024  Patient name: Manule Dumont  : 1955  MRN: [id number]  Referring provider: Lor Blackman*  Dx:   Encounter Diagnosis     ICD-10-CM    1. Right knee pain, unspecified chronicity  M25.561           Start Time: 1614  Stop Time: 1702  Total time in clinic (min): 48 minutes    Subjective: Pt reports he wasn't really sore after previous session.      Objective: See treatment diary below      Assessment: Tolerated treatment well. Patient demonstrated fatigue post treatment, exhibited good technique with therapeutic exercises, and would benefit from continued PT. Pt did well with progressions made today and updated HEP including SLR flexion and S/L clams. Pts form was correct in tx and therefore believe pt will transition well with these at home. Will look to continue progressing HEP until pt leaves for his cruise in 2 wks.      Plan: Continue per plan of care.  Progress treatment as tolerated.       Precautions: standard      Manuals           Jt mobs  GII/GIII           Pat mobs  GII/GIII GII/GIII                                    Neuro Re-Ed             steamboats             Retro walkouts             Lat walkouts                                                                 Ther Ex             IR  RTB 15x  RTB 20x          LAQ SL  22# 15x 22# 20x          HS curls SL  22# 15x 33# 20x          NuStep  L3 5' L4 6'          Seated weight hang   10# 4'          ER   GTB 20x          HS S   10\"x4 B          S/L clams  3\"x15 3\"X20          bridges 3\"X15 3\"x20 3\"X20          SLR flexion  15x B 15x B          Ther Activity             Pt edu: involved anatomy, physio, goals, POC, HEP RE                         Gait Training                                       Modalities                                              "

## 2024-06-10 ENCOUNTER — TELEPHONE (OUTPATIENT)
Age: 69
End: 2024-06-10

## 2024-06-10 ENCOUNTER — OFFICE VISIT (OUTPATIENT)
Dept: PHYSICAL THERAPY | Facility: CLINIC | Age: 69
End: 2024-06-10
Payer: COMMERCIAL

## 2024-06-10 DIAGNOSIS — M25.561 RIGHT KNEE PAIN, UNSPECIFIED CHRONICITY: Primary | ICD-10-CM

## 2024-06-10 PROCEDURE — 97110 THERAPEUTIC EXERCISES: CPT

## 2024-06-10 PROCEDURE — 97112 NEUROMUSCULAR REEDUCATION: CPT

## 2024-06-10 NOTE — PROGRESS NOTES
"Daily Note     Today's date: 6/10/2024  Patient name: Manuel Dumont  : 1955  MRN: [id number]  Referring provider: Lor Blackman*  Dx:   Encounter Diagnosis     ICD-10-CM    1. Right knee pain, unspecified chronicity  M25.561           Start Time: 1659  Stop Time: 1745  Total time in clinic (min): 46 minutes    Subjective: Pt reports no changes since previous session.      Objective: See treatment diary below      Assessment: Tolerated treatment well. Patient demonstrated fatigue post treatment, exhibited good technique with therapeutic exercises, and would benefit from continued PT. Pt was fatigued with addition of leg press however noted no pain throughout the session.      Plan: Continue per plan of care.  Progress treatment as tolerated.       Precautions: standard      Manuals 5/30 6/4 6/6 6/10         Jt mobs  GII/GIII           Pat mobs  GII/GIII GII/GIII                                    Neuro Re-Ed             steamboats             Retro walkouts             Lat walkouts             Heel taps lat    6\" 10x B                                                Ther Ex             DL leg press    90# 20x         IR  RTB 15x  RTB 20x          LAQ SL  22# 15x 22# 20x 22# 20x         HS curls SL  22# 15x 33# 20x 33# 20x         NuStep  L3 5' L4 6' L5 8'         Seated weight hang   10# 4' 10# 5'         ER   GTB 20x GTB 20x         HS S   10\"x4 B 10\"X5 B         S/L clams  3\"x15 3\"X20 2x10 B         bridges 3\"X15 3\"x20 3\"X20 3\"x20         SLR flexion  15x B 15x B 2x10 B         Ther Activity             Pt edu: involved anatomy, physio, goals, POC, HEP RE                         Gait Training                                       Modalities                                                "

## 2024-06-10 NOTE — TELEPHONE ENCOUNTER
Patient called in stating he got a charge for his annual physical because he discussed his right knee pain. He spoke with his insurance and was told to call the office to see if that could be refunded because if he knew he wasn't supposed to talk about anything he wouldn't have said anything. Patient would like a refund for the $20 copay. Please advise.

## 2024-06-13 ENCOUNTER — OFFICE VISIT (OUTPATIENT)
Dept: PHYSICAL THERAPY | Facility: CLINIC | Age: 69
End: 2024-06-13
Payer: COMMERCIAL

## 2024-06-13 DIAGNOSIS — M25.561 RIGHT KNEE PAIN, UNSPECIFIED CHRONICITY: Primary | ICD-10-CM

## 2024-06-13 PROCEDURE — 97112 NEUROMUSCULAR REEDUCATION: CPT

## 2024-06-13 PROCEDURE — 97110 THERAPEUTIC EXERCISES: CPT

## 2024-06-13 NOTE — PROGRESS NOTES
"Daily Note     Today's date: 2024  Patient name: Manuel Dumont  : 1955  MRN: [id number]  Referring provider: Lor Blackman*  Dx:   Encounter Diagnosis     ICD-10-CM    1. Right knee pain, unspecified chronicity  M25.561           Start Time: 1655  Stop Time: 1735  Total time in clinic (min): 40 minutes    Subjective: Pt reports he was sore after previous session however was not in pain.      Objective: See treatment diary below      Assessment: Tolerated treatment well. Patient demonstrated fatigue post treatment, exhibited good technique with therapeutic exercises, and would benefit from continued PT. Pt did very well today noting less fatigue and no knee pain. Pt also does feel as though the seated weight hang at the conclusion of a session is helping. He is going to add heel taps to HEP over the weekend.      Plan: Continue per plan of care.  Progress treatment as tolerated.       Precautions: standard      Manuals 5/30 6/4 6/6 6/10 6/13        Jt mobs  GII/GIII           Pat mobs  GII/GIII GII/GIII                                    Neuro Re-Ed             steamboats             Retro walkouts             Lat walkouts             Heel taps lat    6\" 10x B 6\" 10x B                                               Ther Ex             DL leg press    90# 20x 90# 30x        IR  RTB 15x  RTB 20x          LAQ SL  22# 15x 22# 20x 22# 20x 33# 20x        HS curls SL  22# 15x 33# 20x 33# 20x 33# 20x        NuStep  L3 5' L4 6' L5 8' L5 8'        Seated weight hang   10# 4' 10# 5' 10# 5'        ER   GTB 20x GTB 20x         HS S   10\"x4 B 10\"X5 B 10\"x5 B        S/L clams  3\"x15 3\"X20 2x10 B 2x10 B        bridges 3\"X15 3\"x20 3\"X20 3\"x20 3\"x20        SLR flexion  15x B 15x B 2x10 B 2x10 B        Ther Activity             Pt edu: involved anatomy, physio, goals, POC, HEP RE                         Gait Training                                       Modalities                                                "

## 2024-06-14 DIAGNOSIS — K21.9 GASTROESOPHAGEAL REFLUX DISEASE, UNSPECIFIED WHETHER ESOPHAGITIS PRESENT: ICD-10-CM

## 2024-06-14 RX ORDER — PANTOPRAZOLE SODIUM 40 MG/1
TABLET, DELAYED RELEASE ORAL
Qty: 90 TABLET | Refills: 1 | Status: SHIPPED | OUTPATIENT
Start: 2024-06-14

## 2024-06-17 ENCOUNTER — OFFICE VISIT (OUTPATIENT)
Dept: PHYSICAL THERAPY | Facility: CLINIC | Age: 69
End: 2024-06-17
Payer: COMMERCIAL

## 2024-06-17 DIAGNOSIS — M25.561 RIGHT KNEE PAIN, UNSPECIFIED CHRONICITY: Primary | ICD-10-CM

## 2024-06-17 PROCEDURE — 97112 NEUROMUSCULAR REEDUCATION: CPT

## 2024-06-17 PROCEDURE — 97110 THERAPEUTIC EXERCISES: CPT

## 2024-06-17 NOTE — PROGRESS NOTES
"Daily Note     Today's date: 2024  Patient name: Manuel Dumont  : 1955  MRN: [id number]  Referring provider: Lor Blackman*  Dx:   Encounter Diagnosis     ICD-10-CM    1. Right knee pain, unspecified chronicity  M25.561           Start Time: 0759  Stop Time: 0839  Total time in clinic (min): 40 minutes    Subjective: Pt reports the knee has been feeling good however he was going up/down the steps he didn't have pain but he felt like it was a difficult activity for the knee.      Objective: See treatment diary below      Assessment: Tolerated treatment well. Patient demonstrated fatigue post treatment, exhibited good technique with therapeutic exercises, and would benefit from continued PT. Pt tolerated today's progressions very well with no pain however did note fatigue. Updated HEP for pt with print outs provided and will review at next session to ensure proper performance and understanding prior to leaving for his trip.      Plan:  d/c to HEP at next session.     Precautions: standard      Manuals 5/30 6/4 6/6 6/10 6/13 6/17       Jt mobs  GII/GIII           Pat mobs  GII/GIII GII/GIII                                    Neuro Re-Ed             steamboats             Retro walkouts      25# 5x       Lat walkouts      17# 3x B       Heel taps lat    6\" 10x B 6\" 10x B 6\" 10x B                                              Ther Ex             DL leg press    90# 20x 90# 30x 110# 3x10       IR  RTB 15x  RTB 20x          LAQ SL  22# 15x 22# 20x 22# 20x 33# 20x 33# 2x10       HS curls SL  22# 15x 33# 20x 33# 20x 33# 20x 33# 2x10       NuStep  L3 5' L4 6' L5 8' L5 8' L6 7'       Seated weight hang   10# 4' 10# 5' 10# 5' 10# 5'       ER   GTB 20x GTB 20x  Gtb 20x B       HS S   10\"x4 B 10\"X5 B 10\"x5 B 10\"X5 B       S/L clams  3\"x15 3\"X20 2x10 B 2x10 B        bridges 3\"X15 3\"x20 3\"X20 3\"x20 3\"x20        SLR flexion  15x B 15x B 2x10 B 2x10 B        Ther Activity             Pt edu: involved anatomy, " physio, goals, POC, HEP RE                         Gait Training                                       Modalities

## 2024-06-19 ENCOUNTER — OFFICE VISIT (OUTPATIENT)
Dept: PHYSICAL THERAPY | Facility: CLINIC | Age: 69
End: 2024-06-19
Payer: COMMERCIAL

## 2024-06-19 DIAGNOSIS — M25.561 RIGHT KNEE PAIN, UNSPECIFIED CHRONICITY: Primary | ICD-10-CM

## 2024-06-19 PROCEDURE — 97112 NEUROMUSCULAR REEDUCATION: CPT

## 2024-06-19 PROCEDURE — 97110 THERAPEUTIC EXERCISES: CPT

## 2024-06-19 NOTE — PROGRESS NOTES
"Daily Note     Today's date: 2024  Patient name: Manuel Dumont  : 1955  MRN: [id number]  Referring provider: Lor Blackman*  Dx:   Encounter Diagnosis     ICD-10-CM    1. Right knee pain, unspecified chronicity  M25.561                      Subjective: Pt reports no new complaints.       Objective: See treatment diary below      Assessment: Tolerated treatment well. Patient exhibited good technique with therapeutic exercises and would benefit from continued PT.  Pt given copy of HEP and states compliance.  L hip IR more challenging than R.  Good tolerance to TE performed.      Plan: Progress treatment as tolerated.       Precautions: standard    1:1 8-8:45am  Manuals 5/30 6/4 6/6 6/10 6/13 6/17 6/19      Jt mobs  GII/GIII           Pat mobs  GII/GIII GII/GIII                                    Neuro Re-Ed             steamboats             Retro walkouts      25# 5x 25# x5      Lat walkouts      17# 3x B 17# 3x B      Heel taps lat    6\" 10x B 6\" 10x B 6\" 10x B nv                                             Ther Ex             DL leg press    90# 20x 90# 30x 110# 3x10 110# 3x10      IR  RTB 15x  RTB 20x    GTB 20 ea      LAQ SL  22# 15x 22# 20x 22# 20x 33# 20x 33# 2x10 33# 2x10      HS curls SL  22# 15x 33# 20x 33# 20x 33# 20x 33# 2x10 33# 2x10      NuStep  L3 5' L4 6' L5 8' L5 8' L6 7' 7'  L6      Seated weight hang   10# 4' 10# 5' 10# 5' 10# 5' 10# 5'      ER   GTB 20x GTB 20x  Gtb 20x B GTB 20 ea      HS S   10\"x4 B 10\"X5 B 10\"x5 B 10\"X5 B 10\"x5 B      S/L clams  3\"x15 3\"X20 2x10 B 2x10 B        bridges 3\"X15 3\"x20 3\"X20 3\"x20 3\"x20        SLR flexion  15x B 15x B 2x10 B 2x10 B        Ther Activity             Pt edu: involved anatomy, physio, goals, POC, HEP RE                         Gait Training                                       Modalities                                                      "

## 2024-07-15 ENCOUNTER — OFFICE VISIT (OUTPATIENT)
Dept: FAMILY MEDICINE CLINIC | Facility: CLINIC | Age: 69
End: 2024-07-15
Payer: COMMERCIAL

## 2024-07-15 VITALS
OXYGEN SATURATION: 96 % | WEIGHT: 270.8 LBS | DIASTOLIC BLOOD PRESSURE: 70 MMHG | TEMPERATURE: 96.5 F | SYSTOLIC BLOOD PRESSURE: 110 MMHG | HEIGHT: 74 IN | HEART RATE: 85 BPM | BODY MASS INDEX: 34.75 KG/M2

## 2024-07-15 DIAGNOSIS — R05.3 PERSISTENT COUGH: Primary | ICD-10-CM

## 2024-07-15 DIAGNOSIS — R06.2 WHEEZE: ICD-10-CM

## 2024-07-15 PROBLEM — E66.01 OBESITY, MORBID (HCC): Status: ACTIVE | Noted: 2024-07-15

## 2024-07-15 PROCEDURE — G2211 COMPLEX E/M VISIT ADD ON: HCPCS | Performed by: FAMILY MEDICINE

## 2024-07-15 PROCEDURE — 99213 OFFICE O/P EST LOW 20 MIN: CPT | Performed by: FAMILY MEDICINE

## 2024-07-15 RX ORDER — PREDNISONE 20 MG/1
TABLET ORAL
Qty: 12 TABLET | Refills: 0 | Status: SHIPPED | OUTPATIENT
Start: 2024-07-15

## 2024-07-15 NOTE — PROGRESS NOTES
Ambulatory Visit  Name: Manuel Dumont      : 1955      MRN: [id number]  Encounter Provider: Adria Oliver MD  Encounter Date: 7/15/2024   Encounter department: [facility]    Assessment & Plan   1. Persistent cough  -     predniSONE 20 mg tablet; 3 tab po qd x 2 then 2 tab po qd x 2 then 1 tab po qd x 2  2. Wheeze     Discussion:  I reviewed with pt and wife. Pt with what appears to be diffuse airway inflammation resulting in cough and wheeze/increased exp phase. No fever/chills noted. Start pred taper. Albuterol/budesonide inhaler sample given to be use q6h prn. Start prometh dm for cough. Reviewed proper use of inhaler and side effects of meds. Recheck 4-5d if not improving - earlier if worse      History of Present Illness     68-year-old male here for persistent deep cough.  Patient states that he started with URI symptoms while on a cruise to [place] back at the end of .  While visiting family in [place] on , cough became worse.  Patient was seen at [place] urgent care where he was diagnosed with pneumonia and started on Zithromax.  Patient improved somewhat but never quite resolved.  Patient now has a persistent deep cough that is occasionally productive.  He does note slight shortness of breath on exertion.  He has a history of community-acquired pneumonia x 2 in the past but does not have any history of smoking or asthma.      Review of Systems   Constitutional: Negative.    HENT:  Positive for congestion (slight). Negative for ear discharge, ear pain, sinus pressure, sinus pain and voice change.    Eyes: Negative.    Respiratory:  Positive for cough and shortness of breath (sl with exertion). Negative for wheezing.    Cardiovascular: Negative.      Past Medical History:   Diagnosis Date   • Arthritis    • Woodward's esophagus    • Ear problems    • GERD (gastroesophageal reflux disease)    • Hiatal hernia    • Kidney stone 1996   • Pneumonia 2017  "  • Shingles    • Sleep apnea    • Sleep difficulties    • Tinnitus      Past Surgical History:   Procedure Laterality Date   • CHOLECYSTECTOMY     • CIRCUMCISION     • COLONOSCOPY     • EGD     • KNEE SURGERY     • TONSILLECTOMY       Family History   Problem Relation Age of Onset   • Valvular heart disease Mother    • Hearing loss Mother            • Stroke Father    • Hyperlipidemia Father    • Hearing loss Father            • Macular degeneration Sister    • Hearing loss Brother    • Hearing loss Brother      Social History     Tobacco Use   • Smoking status: Never     Passive exposure: Never   • Smokeless tobacco: Never   Vaping Use   • Vaping status: Never Used   Substance and Sexual Activity   • Alcohol use: Yes     Alcohol/week: 5.0 - 9.0 standard drinks of alcohol     Types: 1 - 2 Glasses of wine, 1 - 2 Cans of beer, 3 - 5 Standard drinks or equivalent per week   • Drug use: Never   • Sexual activity: Yes     Partners: Female     Birth control/protection: Male Sterilization, Female Sterilization     Current Outpatient Medications on File Prior to Visit   Medication Sig   • Misc Natural Products (Glucosamine Chond Complex/MSM) TABS Take 3 capsules by mouth daily   • Multiple Vitamins-Minerals (ICAPS AREDS 2 PO) Take 2 capsules by mouth daily   • multivitamin (THERAGRAN) TABS Take 1 tablet by mouth daily   • pantoprazole (PROTONIX) 40 mg tablet TAKE 1 TABLET DAILY   • Zinc 100 MG TABS Take by mouth     No Known Allergies  Immunization History   Administered Date(s) Administered   • COVID-19 MODERNA VACC 0.5 ML IM 2021, 05/10/2021, 2022   • INFLUENZA 2014, 2015, 10/04/2016, 10/04/2016, 10/01/2017, 10/14/2019, 10/18/2020, 2023   • Tdap 2007, 2019, 10/17/2019     Objective     /70 (BP Location: Left arm, Patient Position: Sitting, Cuff Size: Large)   Pulse 85   Temp (!) 96.5 °F (35.8 °C)   Ht 6' 2\" (1.88 m)   Wt 123 kg (270 lb 12.8 oz)   " SpO2 96%   BMI 34.77 kg/m²     Physical Exam  Vitals reviewed.   HENT:      Head: Normocephalic.      Right Ear: Tympanic membrane, ear canal and external ear normal.      Left Ear: Tympanic membrane, ear canal and external ear normal.      Nose: Congestion (slight) present.      Mouth/Throat:      Mouth: Mucous membranes are moist.   Eyes:      Extraocular Movements: Extraocular movements intact.      Conjunctiva/sclera: Conjunctivae normal.      Pupils: Pupils are equal, round, and reactive to light.   Cardiovascular:      Rate and Rhythm: Normal rate and regular rhythm.      Pulses: Normal pulses.   Pulmonary:      Effort: Pulmonary effort is normal.      Breath sounds: Wheezing present. No rhonchi or rales.      Comments: Sl increased exp phase. Scattered mild wheeze with forceful exhalation  Musculoskeletal:      Cervical back: No tenderness.   Lymphadenopathy:      Cervical: No cervical adenopathy.   Neurological:      Mental Status: He is alert.

## 2024-07-18 ENCOUNTER — NURSE TRIAGE (OUTPATIENT)
Age: 69
End: 2024-07-18

## 2024-07-18 DIAGNOSIS — R06.2 WHEEZE: ICD-10-CM

## 2024-07-18 DIAGNOSIS — R05.3 PERSISTENT COUGH: Primary | ICD-10-CM

## 2024-07-18 RX ORDER — AZITHROMYCIN 250 MG/1
TABLET, FILM COATED ORAL
Qty: 6 TABLET | Refills: 0 | Status: SHIPPED | OUTPATIENT
Start: 2024-07-18 | End: 2024-07-22

## 2024-07-18 NOTE — TELEPHONE ENCOUNTER
"Reason for Disposition  • SEVERE coughing spells (e.g., whooping sound after coughing, vomiting after coughing)    Answer Assessment - Initial Assessment Questions  1. ONSET: \"When did the cough begin?\"           Few days ago     SOB on exertion only       2. SEVERITY: \"How bad is the cough today?\"         Moderate to severe      3. SPUTUM: \"Describe the color of your sputum\" (none, dry cough; clear, white, yellow, green)        Cough non productive    4. HEMOPTYSIS: \"Are you coughing up any blood?\" If so ask: \"How much?\" (flecks, streaks, tablespoons, etc.)          Denies      5. DIFFICULTY BREATHING: \"Are you having difficulty breathing?\" If Yes, ask: \"How bad is it?\" (e.g., mild, moderate, severe)     - MILD: No SOB at rest, mild SOB with walking, speaks normally in sentences, can lay down, no retractions, pulse < 100.     - MODERATE: SOB at rest, SOB with minimal exertion and prefers to sit, cannot lie down flat, speaks in phrases, mild retractions, audible wheezing, pulse 100-120.     - SEVERE: Very SOB at rest, speaks in single words, struggling to breathe, sitting hunched forward, retractions, pulse > 120           SOB on exertion       6. FEVER: \"Do you have a fever?\" If Yes, ask: \"What is your temperature, how was it measured, and when did it start?\"        Denies          7. CARDIAC HISTORY: \"Do you have any history of heart disease?\" (e.g., heart attack, congestive heart failure)         Denies        8. LUNG HISTORY: \"Do you have any history of lung disease?\"  (e.g., pulmonary embolus, asthma, emphysema)        Hx of pneumonia x 2    9. PE RISK FACTORS: \"Do you have a history of blood clots?\" (or: recent major surgery, recent prolonged travel, bedridden)        Denies      10. OTHER SYMPTOMS: \"Do you have any other symptoms?\" (e.g., runny nose, wheezing, chest pain)          Denies chest pain     Wheezing intermittent inhaler given    Protocols used: Cough-ADULT-OH    "

## 2024-07-18 NOTE — TELEPHONE ENCOUNTER
Patient calls stating he was just seen in the office by Dr. Moreno 3 days ago.  Dr Moreno told the patient to [facility] if his symptoms have not improved.   Patient states his cough is not any better.  He has intermittent wheezing. States he is having coughing fits/ spells during the day.  He does use his inhaler and cough medicine . Cough is non productive but deep in his chest.  Denies chest pain but does states he has intermittent SOB on exertion.  Denies fever, weakness, N/V . Patient has 2 more days of the prednisone left.        Please review and call the patient back with further recommendations.

## 2024-08-13 ENCOUNTER — NURSE TRIAGE (OUTPATIENT)
Age: 69
End: 2024-08-13

## 2024-08-13 DIAGNOSIS — J20.9 ACUTE BRONCHITIS, UNSPECIFIED ORGANISM: Primary | ICD-10-CM

## 2024-08-13 RX ORDER — LEVOFLOXACIN 500 MG/1
500 TABLET, FILM COATED ORAL EVERY 24 HOURS
Qty: 10 TABLET | Refills: 0 | Status: SHIPPED | OUTPATIENT
Start: 2024-08-13 | End: 2024-08-23

## 2024-08-13 RX ORDER — BROMPHENIRAMINE MALEATE, PSEUDOEPHEDRINE HYDROCHLORIDE, AND DEXTROMETHORPHAN HYDROBROMIDE 2; 30; 10 MG/5ML; MG/5ML; MG/5ML
5 SYRUP ORAL 4 TIMES DAILY PRN
Qty: 180 ML | Refills: 1 | Status: SHIPPED | OUTPATIENT
Start: 2024-08-13

## 2024-08-13 NOTE — TELEPHONE ENCOUNTER
"Patient was seen in the office on 7/15 and treated for pneumonia.  He calls in today with persistent deep cough with greenish-yellow mucus production. Voice is hoarse but denies sore throat.  Denies fever or blood in sputum.  SOB only with exertion.  Denies wheezing or chest pain.  Patient does not have home COVID test.  No known exposures.  Unable to get appointment today.  Would Dr. Cedillo call something in?  Order an outpatient xray?  Patient states promethazine is not working for his cough.    Please advise and call patient back.          Reason for Disposition   Cough with no complications    Answer Assessment - Initial Assessment Questions  1. ONSET: \"When did the cough begin?\"       8/8 in the am with a sore throat.   2. SEVERITY: \"How bad is the cough today?\"       Cough with laryngitis.   3. SPUTUM: \"Describe the color of your sputum\" (none, dry cough; clear, white, yellow, green)      Greenish, yellow \"plugs\"  4. HEMOPTYSIS: \"Are you coughing up any blood?\" If so ask: \"How much?\" (flecks, streaks, tablespoons, etc.)      Denies  5. DIFFICULTY BREATHING: \"Are you having difficulty breathing?\" If Yes, ask: \"How bad is it?\" (e.g., mild, moderate, severe)     - MILD: No SOB at rest, mild SOB with walking, speaks normally in sentences, can lay down, no retractions, pulse < 100.     - MODERATE: SOB at rest, SOB with minimal exertion and prefers to sit, cannot lie down flat, speaks in phrases, mild retractions, audible wheezing, pulse 100-120.     - SEVERE: Very SOB at rest, speaks in single words, struggling to breathe, sitting hunched forward, retractions, pulse > 120       SOB with exertion  6. FEVER: \"Do you have a fever?\" If Yes, ask: \"What is your temperature, how was it measured, and when did it start?\"      Denies   7. CARDIAC HISTORY: \"Do you have any history of heart disease?\" (e.g., heart attack, congestive heart failure)       Denies   8. LUNG HISTORY: \"Do you have any history of lung disease?\"  " "(e.g., pulmonary embolus, asthma, emphysema)      Not feeling well on July 4th and had outpatient xray.  Positive for pneumonia.   9. PE RISK FACTORS: \"Do you have a history of blood clots?\" (or: recent major surgery, recent prolonged travel, bedridden)      Denies  10. OTHER SYMPTOMS: \"Do you have any other symptoms?\" (e.g., runny nose, wheezing, chest pain)        Denies.  11. PREGNANCY: \"Is there any chance you are pregnant?\" \"When was your last menstrual period?\"        N/A  12. TRAVEL: \"Have you traveled out of the country in the last month?\" (e.g., travel history, exposures)        Denies    Protocols used: Cough-ADULT-[place]    "

## 2024-08-15 ENCOUNTER — HOSPITAL ENCOUNTER (OUTPATIENT)
Dept: RADIOLOGY | Facility: HOSPITAL | Age: 69
Discharge: HOME | End: 2024-08-15
Payer: COMMERCIAL

## 2024-08-15 DIAGNOSIS — R05.3 PERSISTENT COUGH: ICD-10-CM

## 2024-08-15 DIAGNOSIS — R06.2 WHEEZE: ICD-10-CM

## 2024-08-15 PROCEDURE — 71046 X-RAY EXAM CHEST 2 VIEWS: CPT

## 2024-08-15 NOTE — TELEPHONE ENCOUNTER
Patient called is still having a very deep cough that is persistent and nagging, runny nose and laryngitis. Feels cough syrup is not really helping. Would like to know if there is something else he can take. [facility] [address]

## 2024-08-16 ENCOUNTER — OFFICE VISIT (OUTPATIENT)
Dept: FAMILY MEDICINE CLINIC | Facility: CLINIC | Age: 69
End: 2024-08-16

## 2024-08-16 VITALS
TEMPERATURE: 97.1 F | BODY MASS INDEX: 35.04 KG/M2 | WEIGHT: 273 LBS | HEART RATE: 93 BPM | HEIGHT: 74 IN | OXYGEN SATURATION: 96 % | DIASTOLIC BLOOD PRESSURE: 74 MMHG | SYSTOLIC BLOOD PRESSURE: 110 MMHG

## 2024-08-16 DIAGNOSIS — R05.3 CHRONIC COUGH: Primary | ICD-10-CM

## 2024-08-16 RX ORDER — FLUTICASONE PROPIONATE AND SALMETEROL 100; 50 UG/1; UG/1
1 POWDER RESPIRATORY (INHALATION) 2 TIMES DAILY
Qty: 60 BLISTER | Refills: 0 | Status: SHIPPED | OUTPATIENT
Start: 2024-08-16 | End: 2025-02-12

## 2024-08-16 NOTE — TELEPHONE ENCOUNTER
Pt came in for his 10 am appointment, but this was never routed to Clerical. So the appointment was never scheduled.     Pt is coming in at 1:30 with Dr. Horn.  Chest Xray has not been read yet.

## 2024-08-16 NOTE — PROGRESS NOTES
Ambulatory Visit  Name: Manuel Dumont      : 1955      MRN: [id number]  Encounter Provider: Truong Maxwell MD  Encounter Date: 2024   Encounter department: [facility]    Assessment & Plan   1. Chronic cough  -     Ambulatory Referral to Pulmonology; Future  -     Fluticasone-Salmeterol (Advair) 100-50 mcg/dose inhaler; Inhale 1 puff 2 (two) times a day Rinse mouth after use.   Close follow up for any new symptoms, fever  Complete current course of antibiotics and would avoid further antibiotics, given  multiple recent treatments   Start Advair BID   Follow up with pulm if failing to resolve in the next 2-3 weeks     History of Present Illness     HPI Patient reports ongoing illness. He has been sick on and off since . He was treated by urgent cares and would feel transiently better but then would get worse again. So far he has had 2 Zpak, one round of steroids, and is currently on Levaquin. He has noted improvement in his symptoms after a few days of the Levaquin. He feels worse with his coughing and breathing with walking up stairs and talking. He was on multiple flights and traveling during his illness.he was on a cruise in [place] and flew to [place] also.   At the beginning of the week he felt malaise, chills, achy. Those have resolved and he generally feels well now, but has persistent hacking cough with phlegm, sometimes coughing fits.     Review of Systems   Constitutional:  Negative for chills and fever.   HENT:  Negative for congestion and sore throat.    Eyes:  Negative for pain and visual disturbance.   Respiratory:  Positive for cough and shortness of breath.    Cardiovascular:  Negative for chest pain and palpitations.   Gastrointestinal:  Negative for abdominal pain and nausea.   Genitourinary:  Negative for dysuria.   Musculoskeletal:  Negative for arthralgias and myalgias.   Skin:  Negative for rash and wound.   Neurological:  Negative for  "dizziness and headaches.   All other systems reviewed and are negative.    Medical History Reviewed by provider this encounter:       Objective     /74   Pulse 93   Temp (!) 97.1 °F (36.2 °C)   Ht 6' 2\" (1.88 m)   Wt 124 kg (273 lb)   SpO2 96%   BMI 35.05 kg/m²     Patient on a rapid walk in the office, SpO2 remained over 94% at all times, with SOB.     Physical Exam  Vitals and nursing note reviewed.   Constitutional:       General: He is not in acute distress.     Appearance: He is well-developed. He is not ill-appearing or diaphoretic.   HENT:      Head: Normocephalic and atraumatic.      Right Ear: Tympanic membrane, ear canal and external ear normal.      Left Ear: Tympanic membrane, ear canal and external ear normal.   Eyes:      Conjunctiva/sclera: Conjunctivae normal.   Cardiovascular:      Rate and Rhythm: Normal rate and regular rhythm.      Pulses: Normal pulses.      Heart sounds: Normal heart sounds. No murmur heard.  Pulmonary:      Effort: Pulmonary effort is normal. No respiratory distress.      Breath sounds: Normal breath sounds. No wheezing, rhonchi or rales.   Lymphadenopathy:      Cervical: No cervical adenopathy.   Skin:     Findings: No rash.   Neurological:      Mental Status: He is alert.      Cranial Nerves: No cranial nerve deficit.       Administrative Statements           "

## 2024-08-31 DIAGNOSIS — R05.3 CHRONIC COUGH: ICD-10-CM

## 2024-09-01 RX ORDER — FLUTICASONE PROPIONATE AND SALMETEROL 100; 50 UG/1; UG/1
POWDER RESPIRATORY (INHALATION)
Qty: 60 BLISTER | Refills: 0 | Status: SHIPPED | OUTPATIENT
Start: 2024-09-01

## 2024-10-01 ENCOUNTER — CONSULT (OUTPATIENT)
Dept: FAMILY MEDICINE CLINIC | Facility: CLINIC | Age: 69
End: 2024-10-01
Payer: COMMERCIAL

## 2024-10-01 VITALS
DIASTOLIC BLOOD PRESSURE: 82 MMHG | BODY MASS INDEX: 35.45 KG/M2 | OXYGEN SATURATION: 95 % | HEIGHT: 74 IN | TEMPERATURE: 97.5 F | WEIGHT: 276.2 LBS | SYSTOLIC BLOOD PRESSURE: 122 MMHG | HEART RATE: 79 BPM

## 2024-10-01 DIAGNOSIS — H26.9 CATARACT OF BOTH EYES, UNSPECIFIED CATARACT TYPE: ICD-10-CM

## 2024-10-01 DIAGNOSIS — Z01.818 PREOPERATIVE CLEARANCE: Primary | ICD-10-CM

## 2024-10-01 DIAGNOSIS — K21.9 GASTROESOPHAGEAL REFLUX DISEASE WITHOUT ESOPHAGITIS: ICD-10-CM

## 2024-10-01 PROCEDURE — 99213 OFFICE O/P EST LOW 20 MIN: CPT | Performed by: FAMILY MEDICINE

## 2024-10-01 PROCEDURE — 93000 ELECTROCARDIOGRAM COMPLETE: CPT | Performed by: FAMILY MEDICINE

## 2024-10-02 ENCOUNTER — TELEPHONE (OUTPATIENT)
Dept: FAMILY MEDICINE CLINIC | Facility: CLINIC | Age: 69
End: 2024-10-02

## 2024-10-02 NOTE — TELEPHONE ENCOUNTER
Luana from [facility] called to see if we are able to fax over pt's pre op clearance forms from 10/1 visit.   PCP is working on these and it will be faxed over as soon as they are completed

## 2024-10-04 NOTE — TELEPHONE ENCOUNTER
Luana from [facility] called to see if there was an update on the [facility] clearance forms that need to be signed for the patients cataract surgery. She said that if the form is not signed and returned before 12pm today, they will have to cancel the patients surgery. Please advise. Thank you.

## 2024-10-07 NOTE — PROGRESS NOTES
Pre-operative Clearance  Name: Manuel Dumont      : 1955      MRN: [id number]  Encounter Provider: Maria Luz Cedillo DO  Encounter Date: 10/1/2024   Encounter department: [facility]    Assessment & Plan  Preoperative clearance    Orders:    POCT ECG    Cataract of both eyes, unspecified cataract type         Gastroesophageal reflux disease without esophagitis         Pre-operative Clearance:     Revised Cardiac Risk Index:  RCI RISK CLASS I (0 risk factors, risk of major cardiac complications approximately 0.5%)    Clearance:  Patient is medically optimized (CLEARED) for proposed surgery without any additional cardiac testing.      Medication Instructions:   - Avoid herbs or non-directed vitamins one week prior to surgery    - Avoid aspirin containing medications or non-steroidal anti-inflammatory drugs one week preceding surgery    - May take tylenol for pain up until the night before surgery       Pt for left cataract surgery 10/7  History of Present Illness     HPI  Review of Systems   Constitutional:  Negative for appetite change, chills and fever.        + blurred vision   HENT:  Negative for ear pain, facial swelling, rhinorrhea, sinus pain, sore throat and trouble swallowing.    Eyes:  Negative for discharge and redness.   Respiratory:  Negative for chest tightness, shortness of breath and wheezing.    Cardiovascular:  Negative for chest pain and palpitations.   Gastrointestinal:  Negative for abdominal pain, diarrhea, nausea and vomiting.   Endocrine: Negative for polyuria.   Genitourinary:  Negative for dysuria and urgency.   Musculoskeletal:  Negative for arthralgias and back pain.   Skin:  Negative for rash.   Neurological:  Negative for dizziness, weakness and headaches.   Hematological:  Negative for adenopathy.   Psychiatric/Behavioral:  Negative for behavioral problems, confusion and sleep disturbance.    All other systems reviewed and are negative.    Past  "Medical History   Past Medical History:   Diagnosis Date    Arthritis     Woodward's esophagus     Ear problems     GERD (gastroesophageal reflux disease)     Hiatal hernia     Kidney stone 1996    Pneumonia 2017    Shingles 2010    Sleep apnea     Sleep difficulties     Tinnitus      Past Surgical History:   Procedure Laterality Date    CHOLECYSTECTOMY      CIRCUMCISION      COLONOSCOPY      EGD      KNEE SURGERY      TONSILLECTOMY       Family History   Problem Relation Age of Onset    Valvular heart disease Mother     Hearing loss Mother             Stroke Father     Hyperlipidemia Father     Hearing loss Father             Macular degeneration Sister     Hearing loss Brother     Hearing loss Brother      Social History     Tobacco Use    Smoking status: Never     Passive exposure: Never    Smokeless tobacco: Never   Vaping Use    Vaping status: Never Used   Substance and Sexual Activity    Alcohol use: Yes     Alcohol/week: 5.0 - 9.0 standard drinks of alcohol     Types: 1 - 2 Glasses of wine, 1 - 2 Cans of beer, 3 - 5 Standard drinks or equivalent per week    Drug use: Never    Sexual activity: Yes     Partners: Female     Birth control/protection: Male Sterilization, Female Sterilization     Current Outpatient Medications on File Prior to Visit   Medication Sig    Fluticasone-Salmeterol (Advair) 100-50 mcg/dose inhaler INHALE 1 PUFF BY MOUTH TWO TIMES DAILY. RINSE MOUTH AFTER USE.    Misc Natural Products (Glucosamine Chond Complex/MSM) TABS Take 3 capsules by mouth daily    Multiple Vitamins-Minerals (ICAPS AREDS 2 PO) Take 2 capsules by mouth daily    multivitamin (THERAGRAN) TABS Take 1 tablet by mouth daily    pantoprazole (PROTONIX) 40 mg tablet TAKE 1 TABLET DAILY    Zinc 100 MG TABS Take by mouth     No Known Allergies  Objective     /82   Pulse 79   Temp 97.5 °F (36.4 °C)   Ht 6' 2\" (1.88 m)   Wt 125 kg (276 lb 3.2 oz)   SpO2 95%   BMI 35.46 kg/m²     Physical " Exam  Vitals and nursing note reviewed.   Constitutional:       General: He is not in acute distress.     Appearance: Normal appearance. He is well-developed. He is not ill-appearing or diaphoretic.   HENT:      Head: Normocephalic and atraumatic.      Right Ear: Tympanic membrane, ear canal and external ear normal.      Left Ear: Tympanic membrane, ear canal and external ear normal.      Nose: Nose normal. No congestion or rhinorrhea.      Mouth/Throat:      Mouth: Mucous membranes are moist.      Pharynx: Oropharynx is clear. No oropharyngeal exudate or posterior oropharyngeal erythema.   Eyes:      General: No scleral icterus.        Right eye: No discharge.         Left eye: No discharge.      Extraocular Movements: Extraocular movements intact.      Conjunctiva/sclera: Conjunctivae normal.      Pupils: Pupils are equal, round, and reactive to light.   Neck:      Thyroid: No thyromegaly.      Vascular: No carotid bruit or JVD.      Trachea: No tracheal deviation.   Cardiovascular:      Rate and Rhythm: Normal rate and regular rhythm.      Pulses: Normal pulses.      Heart sounds: Normal heart sounds. No murmur heard.  Pulmonary:      Effort: Pulmonary effort is normal. No respiratory distress.      Breath sounds: Normal breath sounds. No stridor. No wheezing, rhonchi or rales.   Abdominal:      General: Abdomen is flat. Bowel sounds are normal. There is no distension.      Palpations: Abdomen is soft. There is no mass.      Tenderness: There is no abdominal tenderness. There is no guarding or rebound.   Musculoskeletal:         General: No swelling, tenderness or deformity. Normal range of motion.      Cervical back: Normal range of motion and neck supple. No rigidity.      Right lower leg: No edema.      Left lower leg: No edema.   Lymphadenopathy:      Cervical: No cervical adenopathy.   Skin:     General: Skin is warm and dry.      Capillary Refill: Capillary refill takes less than 2 seconds.      Coloration:  Skin is not jaundiced.      Findings: No bruising, erythema or rash.   Neurological:      General: No focal deficit present.      Mental Status: He is alert and oriented to person, place, and time.      Cranial Nerves: No cranial nerve deficit.      Sensory: No sensory deficit.      Motor: No abnormal muscle tone.      Coordination: Coordination normal.      Gait: Gait normal.      Deep Tendon Reflexes: Reflexes are normal and symmetric. Reflexes normal.   Psychiatric:         Mood and Affect: Mood normal.         Behavior: Behavior normal.         Thought Content: Thought content normal.         Judgment: Judgment normal.       Administrative Statements   I have spent a total time of 20 minutes in caring for this patient on the day of the visit/encounter including Diagnostic results, Prognosis, and Impressions.    Maria Luz Cedillo, DO

## 2024-10-28 PROBLEM — G47.33 OSA (OBSTRUCTIVE SLEEP APNEA): Status: ACTIVE | Noted: 2024-10-28

## 2025-01-02 DIAGNOSIS — K21.9 GASTROESOPHAGEAL REFLUX DISEASE, UNSPECIFIED WHETHER ESOPHAGITIS PRESENT: ICD-10-CM

## 2025-01-03 RX ORDER — PANTOPRAZOLE SODIUM 40 MG/1
40 TABLET, DELAYED RELEASE ORAL DAILY
Qty: 90 TABLET | Refills: 1 | Status: SHIPPED | OUTPATIENT
Start: 2025-01-03

## 2025-03-06 ENCOUNTER — OFFICE VISIT (OUTPATIENT)
Dept: FAMILY MEDICINE CLINIC | Facility: CLINIC | Age: 70
End: 2025-03-06
Payer: COMMERCIAL

## 2025-03-06 VITALS
HEART RATE: 77 BPM | SYSTOLIC BLOOD PRESSURE: 110 MMHG | HEIGHT: 74 IN | WEIGHT: 269.6 LBS | OXYGEN SATURATION: 96 % | DIASTOLIC BLOOD PRESSURE: 78 MMHG | BODY MASS INDEX: 34.6 KG/M2 | TEMPERATURE: 97.2 F

## 2025-03-06 DIAGNOSIS — R68.89 FLU-LIKE SYMPTOMS: ICD-10-CM

## 2025-03-06 DIAGNOSIS — U07.1 COVID-19: Primary | ICD-10-CM

## 2025-03-06 LAB
SARS-COV-2 AG UPPER RESP QL IA: POSITIVE
SL AMB POCT RAPID FLU A: NORMAL
SL AMB POCT RAPID FLU B: NORMAL
VALID CONTROL: ABNORMAL

## 2025-03-06 PROCEDURE — 87811 SARS-COV-2 COVID19 W/OPTIC: CPT | Performed by: FAMILY MEDICINE

## 2025-03-06 PROCEDURE — 87804 INFLUENZA ASSAY W/OPTIC: CPT | Performed by: FAMILY MEDICINE

## 2025-03-06 PROCEDURE — G2211 COMPLEX E/M VISIT ADD ON: HCPCS | Performed by: FAMILY MEDICINE

## 2025-03-06 PROCEDURE — 99213 OFFICE O/P EST LOW 20 MIN: CPT | Performed by: FAMILY MEDICINE

## 2025-03-06 RX ORDER — PREDNISONE 10 MG/1
TABLET ORAL
Qty: 26 TABLET | Refills: 0 | Status: SHIPPED | OUTPATIENT
Start: 2025-03-06

## 2025-03-06 RX ORDER — CEFDINIR 300 MG/1
300 CAPSULE ORAL EVERY 12 HOURS SCHEDULED
Qty: 20 CAPSULE | Refills: 0 | Status: SHIPPED | OUTPATIENT
Start: 2025-03-06 | End: 2025-03-16

## 2025-03-06 RX ORDER — BROMPHENIRAMINE MALEATE, PSEUDOEPHEDRINE HYDROCHLORIDE, AND DEXTROMETHORPHAN HYDROBROMIDE 2; 30; 10 MG/5ML; MG/5ML; MG/5ML
5 SYRUP ORAL 4 TIMES DAILY PRN
Qty: 180 ML | Refills: 0 | Status: SHIPPED | OUTPATIENT
Start: 2025-03-06

## 2025-03-07 NOTE — PROGRESS NOTES
Patient ID: Manuel Dumont is a 69 y.o. male.    Diagnoses and all orders for this visit:    COVID-19  -     predniSONE 10 mg tablet; 3 tabs po bid x2 days, then 2 tabs po bid x2 days, then 1 tab bid x2 days, then 1 daily until done.  -     brompheniramine-pseudoephedrine-DM 30-2-10 MG/5ML syrup; Take 5 mL by mouth 4 (four) times a day as needed for congestion or cough  -     cefdinir (OMNICEF) 300 mg capsule; Take 1 capsule (300 mg total) by mouth every 12 (twelve) hours for 10 days    Flu-like symptoms  -     POCT Rapid Covid Ag  -     POCT rapid flu A and B    Reviewed with patient plan to treat with above plan.    Patient instructed to call in 72 hours if not feeling better or if symptoms worsen         HPI: 69 y.o.male presenting with 2 day history of sore throat, nasal congestion, ear pain,pnd and cough.  Pt denies any fever, chills, but complains of  body aches.    He tested positive for covid 19.  SUBJECTIVE    Family History   Problem Relation Age of Onset    Valvular heart disease Mother     Hearing loss Mother             Stroke Father     Hyperlipidemia Father     Hearing loss Father             Macular degeneration Sister     Hearing loss Brother     Hearing loss Brother      Social History     Socioeconomic History    Marital status: /Civil Union     Spouse name: Not on file    Number of children: Not on file    Years of education: Not on file    Highest education level: Not on file   Occupational History    Not on file   Tobacco Use    Smoking status: Never     Passive exposure: Never    Smokeless tobacco: Never   Vaping Use    Vaping status: Never Used   Substance and Sexual Activity    Alcohol use: Yes     Alcohol/week: 5.0 - 9.0 standard drinks of alcohol     Types: 1 - 2 Glasses of wine, 1 - 2 Cans of beer, 3 - 5 Standard drinks or equivalent per week    Drug use: Never    Sexual activity: Yes     Partners: Female     Birth control/protection: Male Sterilization, Female  Sterilization   Other Topics Concern    Not on file   Social History Narrative    Not on file     Social Drivers of Health     Financial Resource Strain: Not on file   Food Insecurity: No Food Insecurity (5/7/2024)    Nursing - Inadequate Food Risk Classification     Worried About Running Out of Food in the Last Year: Never true     Ran Out of Food in the Last Year: Never true     Ran Out of Food in the Last Year: Not on file   Transportation Needs: No Transportation Needs (5/7/2024)    PRAPARE - Transportation     Lack of Transportation (Medical): No     Lack of Transportation (Non-Medical): No   Physical Activity: Not on file   Stress: Not on file   Social Connections: Not on file   Intimate Partner Violence: Unknown (7/5/2024)    Received from [facility] and the [facility] Practices    Abuse Risk     Are you in an UNsafe relationship?: Not on file     Does your partner/boyfriend or girlfriend hit, kick, hurt, or threaten you?: Not on file     Have you suffered any injury as a result of abuse in the past year?: Not on file     Does your partner/boyfriend or girlfriend ever try to control you by threatening you or your family?: Not on file     Are you currently being forced to engage in sexual activity?: Not on file     Are you being abused or threatened in your work or home environment?: Not on file     Are you being forced to work?: Not on file     Is the patient a “dependent adult”?: Does not apply     Do you feel unsafe at home?: Not on file     Has anyone tried to force you to sign papers or to use your money against your will?: Not on file   Housing Stability: Low Risk  (5/7/2024)    Housing Stability Vital Sign     Unable to Pay for Housing in the Last Year: No     Number of Times Moved in the Last Year: 1     Homeless in the Last Year: No     Past Medical History:   Diagnosis Date    Arthritis     Woodward's esophagus     Ear problems     GERD (gastroesophageal reflux disease)     Hiatal hernia      Kidney stone Jan 1996    Pneumonia Nov 2017    Shingles 2010    Sleep apnea     Sleep difficulties     Tinnitus      Past Surgical History:   Procedure Laterality Date    CATARACT EXTRACTION Left 10/07/2024    CHOLECYSTECTOMY      CIRCUMCISION      COLONOSCOPY      EGD      KNEE SURGERY      TONSILLECTOMY       No Known Allergies    Current Outpatient Medications:     brompheniramine-pseudoephedrine-DM 30-2-10 MG/5ML syrup, Take 5 mL by mouth 4 (four) times a day as needed for congestion or cough, Disp: 180 mL, Rfl: 0    cefdinir (OMNICEF) 300 mg capsule, Take 1 capsule (300 mg total) by mouth every 12 (twelve) hours for 10 days, Disp: 20 capsule, Rfl: 0    Misc Natural Products (Glucosamine Chond Complex/MSM) TABS, Take 3 capsules by mouth daily, Disp: , Rfl:     Multiple Vitamins-Minerals (ICAPS AREDS 2 PO), Take 2 capsules by mouth daily, Disp: , Rfl:     pantoprazole (PROTONIX) 40 mg tablet, TAKE 1 TABLET DAILY, Disp: 90 tablet, Rfl: 1    predniSONE 10 mg tablet, 3 tabs po bid x2 days, then 2 tabs po bid x2 days, then 1 tab bid x2 days, then 1 daily until done., Disp: 26 tablet, Rfl: 0    Zinc 100 MG TABS, Take by mouth, Disp: , Rfl:     Review of Systems  Constitutional:     Denies fever, chills ,fatigue ,weakness ,weight loss, weight gain     ENT: Denies loss of hearing ,nosebleed, nasal discharge ,hoarseness; but admits to nasal congestion , sore throat and ear pain.    Pulmonary: Denies shortness of breath ,dyspnea on exertion, orthopnea ; but admits to cough and pnd  Cardiovascular:  Denies bradycardia , tachycardia  ,palpations, lower extremity edema leg, claudication  Breast:  Denies new or changing breast lumps ,nipple discharge ,nipple changes  Abdomen:  Denies abdominal pain , anorexia , indigestion, nausea, vomiting, constipation, diarrhea  Musculoskeletal: Denies  arthralgias, joint swelling, joint stiffness , limb pain, limb swelling+ body aches  Lymph: + swollen glands  Gu: Denies polyuria or  "dysuria  Skin: Denies skin rash, skin lesion, skin wound, itching, dry skin  Neuro: Denies headache, numbness, tingling, confusion, loss of consciousness, dizziness, vertigo  Psychiatric: Denies feelings of depression, suicidal ideation, anxiety, sleep disturbances    OBJECTIVE  /78   Pulse 77   Temp (!) 97.2 °F (36.2 °C)   Ht 6' 2\" (1.88 m)   Wt 122 kg (269 lb 9.6 oz)   SpO2 96%   BMI 34.61 kg/m²   Constitutional:   NAD, well appearing and well nourished     ENT:   Conjunctiva and lids: no injection, edema, or discharge    Pupils and iris: PATRICK bilaterally  External inspection of ears and nose: normal without deformities or discharge.     Otoscopic exam: Canals patent without erythema, tm dull and erythematous, effusions bilaterally   Nasal mucosa, septum and turbinates: + turbinate injection, nasal discharge         Oropharynx:  Moist mucosa, normal tongue and tonsils without lesions.+ erythema and injection of posterior pharynx with pnd    Pulmonary:Respiratory effort normal rate and rhythm, no increased work of breathing. Auscultation of lungs:  Clear bilaterally with no adventitious breath sounds     Cardiovascular: regular rate and rhythm, S1 and S2, no murmur, no edema and/or varicosities of LE     Abdomen: Soft and nontender with + bowel sounds  No heptomegaly or splenomegaly     Gu: no suprapubic tenderness or CVA tenderness  Lymphatic: + anterior  cervical lymphadenopathy      Musculoskeletal:  Gait and station: Normal gait      Digits and nails normal without clubbing or cyanosis      Inspection/palpation of joints, bones, and muscles:  No joint tenderness, swelling, full active and passive range of motion       Skin: Normal skin turgor and no rashes      Neuro:    Normal reflexes  Pych:   alert and oriented to person, place and time     normal mood and affect        "

## 2025-03-08 ENCOUNTER — NURSE TRIAGE (OUTPATIENT)
Dept: OTHER | Facility: OTHER | Age: 70
End: 2025-03-08

## 2025-03-08 DIAGNOSIS — U07.1 COVID: Primary | ICD-10-CM

## 2025-03-08 RX ORDER — NIRMATRELVIR AND RITONAVIR 300-100 MG
3 KIT ORAL 2 TIMES DAILY
Qty: 30 TABLET | Refills: 0 | Status: SHIPPED | OUTPATIENT
Start: 2025-03-08 | End: 2025-03-13

## 2025-03-08 NOTE — TELEPHONE ENCOUNTER
"[age] y.o./Covid w/ worsening symptoms  ----- Message from Samantha AMADOR sent at 3/8/2025 11:44 AM EST -----  Patient stated, \"I was diagnosed with Covid and my PCP advised that I reach out if my symptoms advance to my respiratory or worsen. I have a poor chest cough.\"    "

## 2025-03-08 NOTE — TELEPHONE ENCOUNTER
FOLLOW UP: none needed/pt told to follow up with any worsening symptoms.    REASON FOR CONVERSATION: Cough    SYMPTOMS: worsening cough/ Tightness in chest    OTHER: On call provider ok to send in Paxlovid.    DISPOSITION: No disposition on file.

## 2025-03-08 NOTE — TELEPHONE ENCOUNTER
"Answer Assessment - Initial Assessment Questions  1. COVID-19 ONSET: \"When did the symptoms of COVID-19 first start?\"      Wednesday woke up with stuffy nose, felt not right on Tuesday    2. DIAGNOSIS CONFIRMATION: \"How do you know you have had COVID-19?\" (e.g., positive lab test or self- test, diagnosed by doctor or NP/PA, symptoms after exposure)      Tested postive at pcp    3. MAIN SYMPTOM:  \"What is your main concern or symptom right now?\" (e.g., breathing difficulty, cough, fatigue. loss of smell)      Coughing has gotten worse and feels like he can't breathe as deep. Has some tightness in chest.    4. MAIN SYMPTOM ONSET: \"When did the  chest tightness  start?\"      Has gotten worse today    5. BETTER-SAME-WORSE: \"Are you getting better, staying the same, or getting worse over the last 1 to 2 weeks?\"      Has gotten worse today    6. BREATHING DIFFICULTY: \"Are you having any trouble breathing?\" If Yes, ask: \"How bad is your breathing?\" (e.g., mild, moderate, severe)       Can't breathe deep    7. O2 SATURATION MONITOR:  \"Do you use an oxygen saturation monitor (pulse oximeter) at home?\" If Yes, ask \"What is your reading (oxygen level) today?\" \"What is your usual oxygen saturation reading?\" (e.g., 95%)        Battery is dead on O2 sat    8. OTHER SYMPTOMS: \"Do you have any other symptoms?\"  (e.g., cough, fatigue, fever, headache, muscle pain, shortness of breath, weakness)      Denies    9. RECENT MEDICAL VISIT: \"Have you been seen by a healthcare provider (doctor, NP, PA) for these persisting COVID-19 symptoms?\" If Yes, ask: \"When were you seen?\" (e.g., date)      Yes on 3/6    9. HIGH RISK DISEASE: \"Do you have any chronic medical problems?\" (e.g., asthma, heart or lung disease, weak immune system, obesity, etc.)      Has been on steroids, cough meds, and abx.    Protocols used: COVID-19 - Persisting Symptoms Follow-up Call-Adult-    "

## 2025-03-08 NOTE — TELEPHONE ENCOUNTER
On call provider-    Please send in paxlovid normal dose for patient    Pt called and told of providers instructions, pt verbalized understanding.  Reason for Disposition  • [1] Caller has URGENT question AND [2] triager unable to answer question    Protocols used: COVID-19 - Persisting Symptoms Follow-up Call-Adult-

## 2025-03-09 NOTE — TELEPHONE ENCOUNTER
Late entry...I responded by Secure Chat Saturday, 3/8/25 at 12:15pm.  Nurse asked to send in regular dose Paxlovid as renal function is stable and patient is within treatment initiation window.

## 2025-03-11 ENCOUNTER — TELEMEDICINE (OUTPATIENT)
Dept: FAMILY MEDICINE CLINIC | Facility: CLINIC | Age: 70
End: 2025-03-11
Payer: COMMERCIAL

## 2025-03-11 DIAGNOSIS — U07.1 COVID-19: Primary | ICD-10-CM

## 2025-03-11 PROCEDURE — G2211 COMPLEX E/M VISIT ADD ON: HCPCS | Performed by: FAMILY MEDICINE

## 2025-03-11 PROCEDURE — 99213 OFFICE O/P EST LOW 20 MIN: CPT | Performed by: FAMILY MEDICINE

## 2025-03-13 NOTE — PROGRESS NOTES
Virtual Regular VisitName: Manuel Dumont      : 1955      MRN: [id number]  Encounter Provider: Maria Luz Cedillo DO  Encounter Date: 3/11/2025   Encounter department: [facility] [place]  :  Assessment & Plan  COVID-19  Continue with Paxlovid and add on bromfed dm and prednisone to help with wheezing.             History of Present Illness     The patient was diagnosed on 3/8 with Covid 19.  He was started on Paxlovid and was already on Ceftin for an URI.  He now has a harsh cough and is wheezing intermittently.  He complains of feeling slightly out of breath after walking, primarily from the cough acting up.      Review of Systems   Constitutional:  Negative for appetite change, chills and fever.   HENT:  Positive for congestion. Negative for ear pain, facial swelling, rhinorrhea, sinus pain, sore throat and trouble swallowing.    Eyes:  Negative for discharge and redness.   Respiratory:  Positive for cough, shortness of breath and wheezing. Negative for chest tightness.         Shortness of breath with exertion  + wheezing  + cough (nonproductive)   Cardiovascular:  Negative for chest pain and palpitations.   Gastrointestinal:  Negative for abdominal pain, diarrhea, nausea and vomiting.   Endocrine: Negative for polyuria.   Genitourinary:  Negative for dysuria and urgency.   Musculoskeletal:  Negative for arthralgias and back pain.   Skin:  Negative for rash.   Neurological:  Negative for dizziness, weakness and headaches.   Hematological:  Negative for adenopathy.   Psychiatric/Behavioral:  Negative for behavioral problems, confusion and sleep disturbance.    All other systems reviewed and are negative.      Objective   There were no vitals taken for this visit.    Physical Exam  Vitals reviewed.   Constitutional:       Appearance: He is not ill-appearing, toxic-appearing or diaphoretic.   Eyes:      General:         Right eye: No discharge.         Left eye: No discharge.    Pulmonary:      Effort: Respiratory distress present.   Neurological:      General: No focal deficit present.      Mental Status: He is alert and oriented to person, place, and time.   Psychiatric:         Mood and Affect: Mood normal.         Behavior: Behavior normal.         Thought Content: Thought content normal.         Judgment: Judgment normal.         Administrative Statements   Encounter provider Maria Luz Cedillo, DO    The Patient is located at Home and in the following state in which I hold an active license [place].    The patient was identified by name and date of birth. Manuel Dumont was informed that this is a telemedicine visit and that the visit is being conducted through the Epic Embedded platform. He agrees to proceed..  My office door was closed. No one else was in the room.  He acknowledged consent and understanding of privacy and security of the video platform. The patient has agreed to participate and understands they can discontinue the visit at any time.    I have spent a total time of 20 minutes in caring for this patient on the day of the visit/encounter including Diagnostic results, Prognosis, Risks and benefits of tx options, Instructions for management, Patient and family education, Importance of tx compliance, Risk factor reductions, and Impressions, not including the time spent for establishing the audio/video connection.

## 2025-05-08 ENCOUNTER — TELEPHONE (OUTPATIENT)
Age: 70
End: 2025-05-08

## 2025-05-08 NOTE — TELEPHONE ENCOUNTER
Received call from Patient for New Patient - Skin Check - Skin Tag on eylid $150 for 10, $10.00 for each one after that, wart, SOC on right wrist, family history of skin cancer.     Scheduled 2/4/25 3:30 pm Gardner Thaxton. Verified insurance, provided [place] addr. Patient verbalized understanding.

## 2025-06-23 DIAGNOSIS — K21.9 GASTROESOPHAGEAL REFLUX DISEASE, UNSPECIFIED WHETHER ESOPHAGITIS PRESENT: ICD-10-CM

## 2025-06-23 RX ORDER — PANTOPRAZOLE SODIUM 40 MG/1
40 TABLET, DELAYED RELEASE ORAL DAILY
Qty: 90 TABLET | Refills: 1 | Status: SHIPPED | OUTPATIENT
Start: 2025-06-23

## 2025-07-01 ENCOUNTER — TELEPHONE (OUTPATIENT)
Dept: GASTROENTEROLOGY | Facility: CLINIC | Age: 70
End: 2025-07-01

## 2025-07-01 NOTE — TELEPHONE ENCOUNTER
Pt is due for a 3 year EGD - hx of [place]'s with Dr Templeton.     OA Questions for EGD  Date: [  ]  Screened by: [  ]     Referring Provider: [ Dr Templeton  ]     Pre-Screening: BMI [  ]    Past EGD? If yes - Date: [   ]  Physician/Facility: [   ]  Reason: [   ]     SCHEDULING STAFF: If the patient is over 75 years old, please schedule an office visit.  ·      Does the patient want to see a gastroenterologist prior to their procedure to discuss any GI symptoms? no  ·      Has the patient been hospitalized or had abdominal surgery in the past 6 months? no  ·      Does the patient use supplemental oxygen? no  ·      Does the patient take [Coumadin], [Lovenox], [Plavix], [Eliquis], [Xarelto], or other blood thinning medication? [Yes] [No] no  ·      Has the patient had a stroke, cardiac event, or stent placed in the past year? [Yes] [No] no     SCHEDULING STAFF: If patient answers NO to the above questions, then schedule the procedure. If patient answers YES to any of the above questions, then schedule an office appointment.  ·       If a repeat EGD is belated and patient declines procedure à notify provider.

## 2025-07-01 NOTE — TELEPHONE ENCOUNTER
Scheduled date of EGD(as of today): 9/23/25  Physician performing EGD: Dr Templeton  Location of EGD:   Instructions reviewed with patient by: ls emailed   Clearances: n/a

## 2025-07-28 ENCOUNTER — TELEPHONE (OUTPATIENT)
Age: 70
End: 2025-07-28